# Patient Record
Sex: FEMALE | Race: WHITE | NOT HISPANIC OR LATINO | Employment: OTHER | URBAN - METROPOLITAN AREA
[De-identification: names, ages, dates, MRNs, and addresses within clinical notes are randomized per-mention and may not be internally consistent; named-entity substitution may affect disease eponyms.]

---

## 2017-12-08 ENCOUNTER — GENERIC CONVERSION - ENCOUNTER (OUTPATIENT)
Dept: OTHER | Facility: OTHER | Age: 77
End: 2017-12-08

## 2018-01-19 ENCOUNTER — ALLSCRIPTS OFFICE VISIT (OUTPATIENT)
Dept: OTHER | Facility: OTHER | Age: 78
End: 2018-01-19

## 2018-01-19 DIAGNOSIS — G31.84 MILD COGNITIVE IMPAIRMENT: ICD-10-CM

## 2018-01-24 VITALS
HEART RATE: 72 BPM | WEIGHT: 147.5 LBS | BODY MASS INDEX: 23.15 KG/M2 | RESPIRATION RATE: 14 BRPM | HEIGHT: 67 IN | OXYGEN SATURATION: 97 % | SYSTOLIC BLOOD PRESSURE: 158 MMHG | DIASTOLIC BLOOD PRESSURE: 86 MMHG

## 2018-02-04 DIAGNOSIS — G31.84 MCI (MILD COGNITIVE IMPAIRMENT): Primary | ICD-10-CM

## 2018-03-08 ENCOUNTER — HOSPITAL ENCOUNTER (OUTPATIENT)
Dept: MRI IMAGING | Facility: HOSPITAL | Age: 78
Discharge: HOME | End: 2018-03-08
Attending: PSYCHIATRY & NEUROLOGY
Payer: MEDICARE

## 2018-03-08 DIAGNOSIS — G31.84 MCI (MILD COGNITIVE IMPAIRMENT): ICD-10-CM

## 2018-03-08 PROCEDURE — 70551 MRI BRAIN STEM W/O DYE: CPT

## 2018-03-08 RX ORDER — MECLIZINE HYDROCHLORIDE 25 MG/1
1 TABLET ORAL EVERY 8 HOURS
COMMUNITY
End: 2019-04-11 | Stop reason: ALTCHOICE

## 2018-03-08 RX ORDER — LISINOPRIL 2.5 MG/1
5 TABLET ORAL DAILY
COMMUNITY

## 2018-03-08 RX ORDER — DONEPEZIL HYDROCHLORIDE 5 MG/1
1 TABLET, FILM COATED ORAL DAILY
COMMUNITY
End: 2018-03-12 | Stop reason: ALTCHOICE

## 2018-03-09 ENCOUNTER — TELEPHONE (OUTPATIENT)
Dept: NEUROLOGY | Facility: CLINIC | Age: 78
End: 2018-03-09

## 2018-03-09 NOTE — TELEPHONE ENCOUNTER
Called patient several times to attempt to schedule Neuropsychology appointment with no response in return from patient and no response to attempted to reach letters sent

## 2018-03-12 ENCOUNTER — OFFICE VISIT (OUTPATIENT)
Dept: NEUROLOGY | Facility: CLINIC | Age: 78
End: 2018-03-12
Payer: MEDICARE

## 2018-03-12 VITALS
HEART RATE: 73 BPM | BODY MASS INDEX: 23.79 KG/M2 | RESPIRATION RATE: 14 BRPM | HEIGHT: 67 IN | SYSTOLIC BLOOD PRESSURE: 165 MMHG | WEIGHT: 151.6 LBS | DIASTOLIC BLOOD PRESSURE: 83 MMHG

## 2018-03-12 DIAGNOSIS — G31.84 AMNESTIC MCI (MILD COGNITIVE IMPAIRMENT WITH MEMORY LOSS): Primary | ICD-10-CM

## 2018-03-12 PROCEDURE — 99215 OFFICE O/P EST HI 40 MIN: CPT | Performed by: PSYCHIATRY & NEUROLOGY

## 2018-03-12 RX ORDER — METHYLPREDNISOLONE 4 MG/1
TABLET ORAL
Refills: 0 | COMMUNITY
Start: 2018-02-11 | End: 2019-04-11 | Stop reason: ALTCHOICE

## 2018-03-12 RX ORDER — TRIAMCINOLONE ACETONIDE 1 MG/G
CREAM TOPICAL
Refills: 0 | COMMUNITY
Start: 2018-02-11 | End: 2019-08-14 | Stop reason: CLARIF

## 2018-03-12 RX ORDER — FOLIC ACID 1 MG/1
1 TABLET ORAL
COMMUNITY
End: 2019-08-14 | Stop reason: CLARIF

## 2018-03-12 RX ORDER — RIVASTIGMINE 4.6 MG/24H
1 PATCH, EXTENDED RELEASE TRANSDERMAL DAILY
Qty: 30 PATCH | Refills: 3 | Status: SHIPPED | OUTPATIENT
Start: 2018-03-12 | End: 2018-06-21 | Stop reason: DRUGHIGH

## 2018-03-12 RX ORDER — ALENDRONATE SODIUM 70 MG/1
70 TABLET ORAL WEEKLY
COMMUNITY
Start: 2017-07-05 | End: 2019-12-18

## 2018-03-12 RX ORDER — MULTIVITAMIN,THER AND MINERALS
1 TABLET ORAL
COMMUNITY

## 2018-03-12 NOTE — PROGRESS NOTES
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    Relevant Medications    rivastigmine (EXELON) 4 6 mg/24 hr TD 24 hr patch    Other Relevant Orders    Ambulatory referral to [facility]         Mrs Guru John has presented for follow up on cognitive dysfunction - she has amnestic type of MCI  Concern comes with her recent MRI brain Neuroquant as she has hippocampal volume loss at 11%, which is low normal, but significant enough to consider Alzheimer's dementia in case of progression of cognitive dysfunction  Patient was asked to start Exelon ER 4 6 mg patch, and she is to start cognitive therapy this week  Patient will schedule evaluation with Dr Lilia Murcia - better understanding of underlying neurodegenerative process  HPI/History of Present Illness    Mrs Guru John has a history of bilateral carotid bruit, bilateral hearing loss, hypertension, bilateral cataract, mitral and aortic heart valve disease, hyperlipidemia, osteoporosis, vertigo, vitamin-D deficiency,who presented for follow up on cognitive dysfunction and hearingloss  Patient has no new focal neurologic deficit, with no significant cognitive decline noted  Patient has several events when she was not able to recall where she is while in [place], don't remember parts of furniture in her house  Patient has difficulties recalling events and conversation  Patient has been frustrated as she has difficulties recalling names, and events  Patient just returned from [place] and she will start cognitive therapy tomorrow  No tremors, no falls, no focal weakness has been noted  The following portions of the patient's history were reviewed and updated as appropriate:   She  has no past medical history on file    She   Patient Active Problem List    Diagnosis Date Noted    Amnestic MCI (mild cognitive impairment with memory loss) 03/12/2018     She  has no past surgical history on file  Her family history is not on file  She  reports that she has never smoked  She has never used smokeless tobacco  Her alcohol and drug histories are not on file  Current Outpatient Prescriptions   Medication Sig Dispense Refill    aspirin 81 MG tablet Take 81 mg by mouth      lisinopril (ZESTRIL) 2 5 mg tablet Take 1 tablet by mouth daily      Multiple Vitamins-Iron (DAILY LEROY MULTIVITAMIN/IRON) TABS Take 1 tablet by mouth      alendronate (FOSAMAX) 70 mg tablet Take 70 mg by mouth Once a week      DOCOSAHEXAENOIC ACID PO Take 1,000 mg by mouth      folic acid (FOLVITE) 1 mg tablet Take 1 tablet by mouth      meclizine (ANTIVERT) 25 mg tablet Take 1 tablet by mouth every 8 (eight) hours      Methylprednisolone 4 MG TBPK TK UTD  0    rivastigmine (EXELON) 4 6 mg/24 hr TD 24 hr patch Place 1 patch on the skin daily 30 patch 3    triamcinolone (KENALOG) 0 1 % cream DARIUS AA TID PRN  0     No current facility-administered medications for this visit  No current outpatient prescriptions on file prior to visit  No current facility-administered medications on file prior to visit  She is allergic to ciprofloxacin            Objective:    Blood pressure 165/83, pulse 73, resp  rate 14, height 5' 7" (1 702 m), weight 68 8 kg (151 lb 9 6 oz)  Physical Exam/Neurological Exam    CONSTITUTIONAL: NAD, pleasant  NECK: supple, no lymphadenopathy, no thyromegaly, no JVD  CARDIOVASCULAR: RRR, normal S1S2, no murmurs, no rubs  RESP: clear to auscultation bilaterally, no wheezes/rhonchi/rales  ABDOMEN: soft, non tender, non distended  SKIN: no rash or skin lesions  EXTREMITIES: no edema, pulses 2+bilaterally  PSYCH: appropriate mood and affect  NEUROLOGIC COMPREHENSIVE EXAM: Patient is oriented to person, place and time, NAD; appropriate affect   CN II, III, IV, V, VI, VII,VIII,IX,X,XI-XII intact with EOMI, PERRLA, OKN intact, VF grossly intact, fundi poorly visualized secondary to pupillary constriction; symmetric face noted  Motor: 5/5 UE/LE bilateral symmetric; Sensory: intact to light touch and pinprick bilaterally; normal vibration sensation feet bilaterally; Coordination within normal limits on FTN and SHAHAB testing; DTR: 2/4 through, no Babinski, no clonus  Tandem gait is intact  Romberg: negative  ROS:  12 points of review of system was reviewed with the patient and was unremarkable with exception: see HPI  Review of Systems   Constitutional: Negative  Negative for appetite change and fever  HENT: Negative  Negative for hearing loss, tinnitus, trouble swallowing and voice change  Eyes: Negative  Negative for photophobia and pain  Respiratory: Negative  Negative for shortness of breath  Cardiovascular: Negative  Negative for palpitations  Gastrointestinal: Negative  Negative for nausea and vomiting  Endocrine: Negative  Negative for cold intolerance and heat intolerance  Genitourinary: Negative  Negative for dysuria, frequency and urgency  Musculoskeletal: Negative  Negative for myalgias and neck pain  Skin: Negative  Negative for rash  Neurological: Negative for tremors, seizures, syncope, facial asymmetry, speech difficulty, weakness, light-headedness, numbness and headaches  Hematological: Negative  Does not bruise/bleed easily  Psychiatric/Behavioral: Positive for confusion  Negative for hallucinations and sleep disturbance

## 2018-03-13 ENCOUNTER — TELEPHONE (OUTPATIENT)
Dept: NEUROLOGY | Facility: CLINIC | Age: 78
End: 2018-03-13

## 2018-03-13 ENCOUNTER — EVALUATION (OUTPATIENT)
Dept: SPEECH THERAPY | Facility: CLINIC | Age: 78
End: 2018-03-13
Payer: MEDICARE

## 2018-03-13 DIAGNOSIS — R48.8 OTHER SYMBOLIC DYSFUNCTIONS (CODE): Primary | ICD-10-CM

## 2018-03-13 DIAGNOSIS — G31.84 AMNESTIC MCI (MILD COGNITIVE IMPAIRMENT WITH MEMORY LOSS): ICD-10-CM

## 2018-03-13 PROCEDURE — 96125 COGNITIVE TEST BY HC PRO: CPT

## 2018-03-13 PROCEDURE — G9168 MEMORY CURRENT STATUS: HCPCS

## 2018-03-13 PROCEDURE — G9169 MEMORY GOAL STATUS: HCPCS

## 2018-03-13 NOTE — TELEPHONE ENCOUNTER
Leland Barton - please help scheduling  appointment with Dr Tommy Hightower or Dr Nohemi Bueno - patient apologized, as she was in [place] and was not able to contact your office  Thank you in advance

## 2018-03-13 NOTE — PROGRESS NOTES
Speech-Language Pathology Initial Evaluation    Today's date: 3/13/2018  Patients name: Manuelito Rai  : 1940  MRN: [id number]  Referring provider: Castro Paniagua     Medical history significant for: No past medical history on file  Medication list:   Current Outpatient Prescriptions   Medication Sig Dispense Refill    alendronate (FOSAMAX) 70 mg tablet Take 70 mg by mouth Once a week      aspirin 81 MG tablet Take 81 mg by mouth      DOCOSAHEXAENOIC ACID PO Take 1,000 mg by mouth      folic acid (FOLVITE) 1 mg tablet Take 1 tablet by mouth      lisinopril (ZESTRIL) 2 5 mg tablet Take 1 tablet by mouth daily      meclizine (ANTIVERT) 25 mg tablet Take 1 tablet by mouth every 8 (eight) hours      Methylprednisolone 4 MG TBPK TK UTD  0    Multiple Vitamins-Iron (DAILY LEROY MULTIVITAMIN/IRON) TABS Take 1 tablet by mouth      rivastigmine (EXELON) 4 6 mg/24 hr TD 24 hr patch Place 1 patch on the skin daily 30 patch 3    triamcinolone (KENALOG) 0 1 % cream DARIUS AA TID PRN  0     No current facility-administered medications for this visit  Allergies: Allergies   Allergen Reactions    Ciprofloxacin Hives     Action Taken: unknown;          Subjective comments: Patients  manuelito reports "She doesn't remember things from day to day and even more often hour to hour "    Reason for referral: Change in cognitive status  Prior functional status:Patient has been reporting progressing decline of memory function  Clinically complex situations: Mental/behavioral diagnosis affecting rate of recovery    History: Patient is a 67 y/o F who was referred for ST by her neurologist Dr Theodore Nugent after persistent cognitive dysfunction- particularly her memory  Per MD note from yesterday 3/12/18,  Mrs Alfredito Rivers has presented for follow up on cognitive dysfunction - she has amnestic type of MCI   Concern comes with her recent MRI brain Neuroquant as she has hippocampal volume loss at 11%, which is low normal, but significant enough to consider Alzheimer's dementia in case of progression of cognitive dysfunction  Patient was asked to start Exelon ER 4 6 mg patch, and she is to start cognitive therapy this week  Mrs Tasha Draper has a history of bilateral carotid bruit, bilateral hearing loss, hypertension, bilateral cataract, mitral and aortic heart valve disease, hyperlipidemia, osteoporosis, vertigo, vitamin-D deficiency,who presented for follow up on cognitive dysfunction and hearingloss  Patient has no new focal neurologic deficit, with no significant cognitive decline noted  Patient has several events when she was not able to recall where she is while in [place], don't remember parts of furniture in her house  Patient has difficulties recalling events and conversation  Patient has been frustrated as she has difficulties recalling names, and events  It was also noted in the MD note that the patient will be scheduling an appointment with Dr Malini Daugherty a neuropsychologist for additional assessment  Patient and her  reports a lack of insight and that patient is no longer responsible for bills but is still independent in medication management      Hearing: exam approx 2 months ago through [place] and was recommended to get hearing aids but patient decided against them at this time  Vision: wears glasses for distance    Primary language: English   Preferred language: English     Home environment/lifestyle: Lives home with   Highest level of education: High School  Current/prior services being received: Physical Therapy in [place] for vertigo within the last 6 months- patient reports that this is resolved    Mental status: Alert  Behavior status: Cooperative  Communication modalities: Verbal  Recent speech/language therapy: none  Rehabilitation prognosis: [address] rehab potential to reach the established goals    Assessments    *Patient indicated that she is experiencing the following symptoms:    · Memory: Remembering your schedule, Remembering previous learing and Driving directions    · Attention: Focusing or concentrating on a specific task, Sustaining attention on a task and Dividing your attention (i e , multi-tasking)    · Executive Functions: Organizing/ planning written work, emails, and/or daily tasks and Initiating tasks    · Visual: New onset motion sickness in car    · Emotional: Personality changes and Keeping cognitive and physical pace    · Increased Sensitivities to: Noise      The Repeatable Battery for the Assessment of Neuropsychological Status (RBANS) is a brief, individually-administered assessment which measures attention, language, visuospatial/constructional abilities, and immediate & delayed memory  The RBANS is intended for use with adolescents to adults, ages 15 to 80 years  The following results were obtained during the administration of the assessment  Form: A    Cognitive Domain/Subtest: Index Score: Percentile Rank: Classification:   IMMEDIATE MEMORY 53  1%ile Extremely Low        1  List Learning (16/40)        2  Story Memory (2/24)       VISUOSPATIAL/  CONSTRUCTIONAL 112 79%ile High Average        3  Figure Copy (20/20)        4  Line Orientation (17/20)       LANGUAGE 10 12%ile Low Average        5  Picture Naming (10/10)        6  Semantic Fluency (9/40)       ATTENTION 82 12%ile Low Average        7  Digit Span (7/16)        8  Coding (39/89)       DELAYED MEMORY 52  1%ile Extremely Low        9  List Recall (0/10)        10  List Recognition (16/20)        11  Story Recall (0/12)        12  Figure Recall (4/20)         Sum of Index Scores:  381   Total Score:  70   Percentile: 2%ile   Classification: Extremely Low     The Brief Cognitive Assessment Test (BCAT) is a multi-domain cognitive tool that assesses a patients orientation, verbal recall, visual recognition, visual recall, attention, abstraction, language, executive functions, and visuospatial processing   BCAT is sensitive to the full spectrum of cognitive functioning (normal, MCI, mild dementia, moderate-severe dementia) and can predict basic and instrumental activities of daily living (ADL, IADL)  During todays administration of the test, pt achieved a total score of  40/50 points  Using the BCAT Crosswalk Table as a reference, pts score falls within the range and may be suggestive of "Mild Cognitive Impairment"  The Crosswalk Table suggests the following:    Cognitive Stage: Mild Cognitive Impairment (BCAT Range: 34-46 // MMSE: 24-27 // GDS: 3)  Cognitive & Functional Issues: Generally functionally normal, but early specific functional declines (IADL); subjective and objective memory deficits  Individuals at lower end of range more likely to have more significant cognitive deficits  Lower scores more suggestive of residential support needs  At the bottom range of MCI, consider medication management and consider support around community reintegration  *Pt named 9 concrete category members (animals) in 60 sec (norm=15+)  -- BELOW AVERAGE    *Pt named 7 abstract category members (words beginning with letter 'm') in 60 sec (norm=10+)  -- BELOW AVERAGE       Goals  Short-term goals:  1  Patient will complete concrete and abstract categorization tasks to 80% accuracy to facilitate improved generative naming skills and working memory, to be achieved in 4-6 weeks  2 Patient will complete auditory immediate and short term memory tasks to 80% accuracy to facilitate increased ability to retell narratives and recall information within functional living environment, to be achieved in 4-6 weeks  3  Patient will complete thought organization tasks (e g , sequencing, deduction puzzles, etc ) with 80% accuracy to facilitate increased executive functioning skills, to be achieved in 4-6 weeks    4 Patient will complete complex auditory attention processing tasks (e g , sentence unscramble, ranking numbers/words, etc ) with 80% accuracy, to be achieved in 4-6 weeks  5   Patient will be educated on the use of external memory aids and compensatory strategies with 80% accuracy to facilitate increased recall of routine, personal information, and recent events, to be achieved in 4-6 weeks  Long-term goals:  1  Patient will improve functional cognitive-linguistic skills with the implementation of cues and external aids, by discharge  2  Patient will demonstrate cognitive-communication skills consistent with age and education given use of compensatory strategies when needed to resume baseline activities and responsibilities in home, community, and work/school settings by discharge  Functional Limitations Reporting (G-codes):   Flowsheet Rows    Flowsheet Row Most Recent Value   SLP G-Codes   FOTO information reviewed  N/A   Assessment Type  Evaluation   Functional Limitations  Memory   Memory Current Status ()  CK   Memory Goal Status ()  CI          Impressions/Recommendations    Impressions: Patient presents with mild-moderate cognitive-linguistic deficits characterized by decreased attention, thought organization and immediate and delayed recall  Recommendations:  -Patient would benefit from outpatient skilled Speech Therapy services : Cognitive-Linguistic therapy    -Frequency: 2x weekly  -Duration: 4-6 weeks    -Intervention certification from: 2/96/1399  -Intervention certification to: 2/52/30  -Intervention comments: Test Administration conducted from 1:00p- 2:05p  Scoring and Interpretation conducted from 2:05-3:05p    Visit Tracking:  -Referring provider: Epic  -Billing guidelines: CMS  -Visit #1/10   -Medicare   [facility] due 4/13/18

## 2018-03-19 ENCOUNTER — OFFICE VISIT (OUTPATIENT)
Dept: SPEECH THERAPY | Facility: CLINIC | Age: 78
End: 2018-03-19
Payer: MEDICARE

## 2018-03-19 DIAGNOSIS — R48.8 OTHER SYMBOLIC DYSFUNCTIONS (CODE): Primary | ICD-10-CM

## 2018-03-19 DIAGNOSIS — G31.84 AMNESTIC MCI (MILD COGNITIVE IMPAIRMENT WITH MEMORY LOSS): ICD-10-CM

## 2018-03-19 PROCEDURE — 92507 TX SP LANG VOICE COMM INDIV: CPT

## 2018-03-19 NOTE — PROGRESS NOTES
Daily Speech Treatment Note    Today's date: 3/19/2018  Patients name: Wanda Ellis  : 1940  MRN: [id number]  Safety measures: decreased cognition  Referring provider: Max Pascual, *    Primary Diagnosis/Billing code: R48 8  Secondary Diagnosis/ Billing code: G31 84    Visit Tracking:  -Referring provider: Epic  -Billing guidelines: CMS  -Visit #1/10   -Medicare  [place]  -RE due 18    Subjective/Behavioral:  -"I don't think it was that bad"    Objective/Assessment:  -Patient's family member/caregiver was present during today's session   -Reviewed testing results and goals in plan care with patient  Patient is in agreement at this time  Short-term goals:  1  Patient will complete concrete and abstract categorization tasks to 80% accuracy to facilitate improved generative naming skills and working memory, to be achieved in 4-6 weeks  Fill in blanks with recall: Patient was asked to fill in missing letters within a 10 word list of words within a category (i e , fruits; o_an_e (orange))  Patient completed ID of words in  opp  Patient was then educated on "chunking" memory strategy, and asked to use this strategy to study or memorize the two lists of words  Patient was provided with MOD-MAX cues to create groups/chunking of words  Immediate recall completed in 10/10 opp for list 1, and 7/10 opp for list 2  DELAYED RECALL: At the end of the session; she was asked to recall the words from the first activity  Despite using association strategies, pt recalled   2 Patient will complete auditory immediate and short term memory tasks to 80% accuracy to facilitate increased ability to retell narratives and recall information within functional living environment, to be achieved in 4-6 weeks  To target immediate memory; patient participated in a mental manipulation task   Words were read aloud by clinician, and patient was asked to recall words in a specific order denoted by SLP  Patient completed recall of words in reverse order (field of 3) in 9/10 opp; increased to 10/10 with repetition of stimuli  Patient completed recall of words in word order (field of 3) in 9/10 opp; increased to 10/10 with repetition of stimuli  Patient completed recall of words in alphabetical order (field of 3) in 10/10 opp  3  Patient will complete thought organization tasks (e g , sequencing, deduction puzzles, etc ) with 80% accuracy to facilitate increased executive functioning skills, to be achieved in 4-6 weeks  4 Patient will complete complex auditory attention processing tasks (e g , sentence unscramble, ranking numbers/words, etc ) with 80% accuracy, to be achieved in 4-6 weeks  5   Patient will be educated on the use of external memory aids and compensatory strategies with 80% accuracy to facilitate increased recall of routine, personal information, and recent events, to be achieved in 4-6 weeks  Discussed external aids in place at home at this time  Patient's  reports she uses a pill box  At times, she needs reminders to take them  She also writes things on a calendar  "memory tips" packet was provided today  Plan:  -Patient was provided with home exercises/activities to target goals in plan of care at the end of today's session   -Continue with current plan of care

## 2018-03-22 ENCOUNTER — APPOINTMENT (OUTPATIENT)
Dept: SPEECH THERAPY | Facility: CLINIC | Age: 78
End: 2018-03-22
Payer: MEDICARE

## 2018-03-26 ENCOUNTER — APPOINTMENT (OUTPATIENT)
Dept: SPEECH THERAPY | Facility: CLINIC | Age: 78
End: 2018-03-26
Payer: MEDICARE

## 2018-03-27 ENCOUNTER — APPOINTMENT (OUTPATIENT)
Dept: SPEECH THERAPY | Facility: CLINIC | Age: 78
End: 2018-03-27
Payer: MEDICARE

## 2018-03-28 ENCOUNTER — OFFICE VISIT (OUTPATIENT)
Dept: SPEECH THERAPY | Facility: CLINIC | Age: 78
End: 2018-03-28
Payer: MEDICARE

## 2018-03-28 DIAGNOSIS — R48.8 OTHER SYMBOLIC DYSFUNCTIONS (CODE): Primary | ICD-10-CM

## 2018-03-28 DIAGNOSIS — G31.84 AMNESTIC MCI (MILD COGNITIVE IMPAIRMENT WITH MEMORY LOSS): ICD-10-CM

## 2018-03-28 PROCEDURE — 92507 TX SP LANG VOICE COMM INDIV: CPT

## 2018-03-28 NOTE — PROGRESS NOTES
Daily Speech Treatment Note    Today's date: 3/28/2018  Patients name: Wanda Ellis  : 1940  MRN: [id number]  Safety measures: decreased cognition  Referring provider: Max Pascual, *    Primary Diagnosis/Billing code: R48 8  Secondary Diagnosis/ Billing code: G31 84    Visit Tracking:  -Referring provider: Epic  -Billing guidelines: CMS  -Visit #3/10   -Medicare  [employer]  -RE due 18    Subjective/Behavioral:  -"i'm ok"    Objective/Assessment:  -Patient's family member/caregiver was present during today's session   -Reviewed patient's home exercises/activities completed since last appointment  Patient forgot to bring in today  Patient arrived with iPad from home  Pt's granddaughter set up an alarm daily as a reminder to take meds  Patient appeared frustrated, and reports she "doesnt have a problem taking meds " Her  and I discussed with her the reasoning behind cognitive therapy, despite her not realizing she has memory problems  Patient does not appear motivated, but participated without difficulty today  Short-term goals:  1  Patient will complete concrete and abstract categorization tasks to 80% accuracy to facilitate improved generative naming skills and working memory, to be achieved in 4-6 weeks  2 Patient will complete auditory immediate and short term memory tasks to 80% accuracy to facilitate increased ability to retell narratives and recall information within functional living environment, to be achieved in 4-6 weeks  Picture Retention Activity: To target working memory, the patient was shown a detailed picture (i e , kitchen scene) and was given 60 seconds to review it  Then they were asked [facility]- questions about picture  (i e , what two items were on the counter)  Task completed over 3 trials with 27/30 questions answered accurately  Delayed recall:     3   Patient will complete thought organization tasks (e g , sequencing, deduction puzzles, etc ) with 80% accuracy to facilitate increased executive functioning skills, to be achieved in 4-6 weeks  Anagrams: To target mental manipulation during a word finding activity, patient was asked to rearrange the letters within the word to create a new word (i e , late = tale)  Task completed over 15 trials  Completed 12/15 independently  Deduction by exclusion: Completed independently with 100% acc  4 Patient will complete complex auditory attention processing tasks (e g , sentence unscramble, ranking numbers/words, etc ) with 80% accuracy, to be achieved in 4-6 weeks  5   Patient will be educated on the use of external memory aids and compensatory strategies with 80% accuracy to facilitate increased recall of routine, personal information, and recent events, to be achieved in 4-6 weeks  Plan:  -Patient was provided with home exercises/activities to target goals in plan of care at the end of today's session   -Continue with current plan of care

## 2018-03-30 ENCOUNTER — APPOINTMENT (OUTPATIENT)
Dept: SPEECH THERAPY | Facility: CLINIC | Age: 78
End: 2018-03-30
Payer: MEDICARE

## 2018-03-30 ENCOUNTER — TELEPHONE (OUTPATIENT)
Dept: NEUROLOGY | Facility: CLINIC | Age: 78
End: 2018-03-30

## 2018-03-30 DIAGNOSIS — G31.84 MCI (MILD COGNITIVE IMPAIRMENT): Primary | ICD-10-CM

## 2018-03-30 NOTE — TELEPHONE ENCOUNTER
MSW spoke with Marcel Lantigua,   Rod Rhina will print script, put it in an envelope, and deliver it to the [facility] on [address]  Patient/ will then  same on Monday 4/2/18 when the release is signed to transfer care to [address] [place] [address]  MSW will be available to patient/ as needed

## 2018-03-30 NOTE — TELEPHONE ENCOUNTER
LATE ENTRY from 3/30/18:    MSW received a call from patient's , Kyliejesus Parnelluels, who stated that they would like to change speech therapy locations  Ellyn Singh stated that they are currently travelling 60 miles round trip to speech therapy a few times a month, and that it is taxing on the patient  Ellyn Singh stated that he has located a closer location for speech therapy at a satellite office for the [address] [place] [address] Road was inquired about what would be the procedure to change providers  MSW spoke with Robert Trinh () at [facility] on [address] where patient is currently going  Robert Trinh advised that patient will need to come in to sign a Release of Information paper so that patient's records can be forwarded to the new speech therapy provider  Robert Trinh advised that patient/ should provide her with the fax number for the new speech therapy location and she will fax patient's records to them directly  Robert Trinh stated that patient will need to be discharged from [place] [address], so a new script will be needed for patient to obtain services elsewhere  MSW will request this script from Dr Gilmar Santos  MSW will deliver the script to the [place] therapy office so patient can  same when she comes in to sign release  MSW phoned Ellyn Singh to make him aware of above  Ellyn Singh stated that patient has an appointment on Mpnday 4/2/18 at [address] so they will sign release/ script for alternate speech therapy at that time  MSW will be available to patient/family as needed

## 2018-04-02 ENCOUNTER — OFFICE VISIT (OUTPATIENT)
Dept: SPEECH THERAPY | Facility: CLINIC | Age: 78
End: 2018-04-02
Payer: MEDICARE

## 2018-04-02 DIAGNOSIS — G31.84 AMNESTIC MCI (MILD COGNITIVE IMPAIRMENT WITH MEMORY LOSS): ICD-10-CM

## 2018-04-02 DIAGNOSIS — R48.8 OTHER SYMBOLIC DYSFUNCTIONS (CODE): Primary | ICD-10-CM

## 2018-04-02 PROCEDURE — G9170 MEMORY D/C STATUS: HCPCS

## 2018-04-02 PROCEDURE — G9169 MEMORY GOAL STATUS: HCPCS

## 2018-04-02 PROCEDURE — 92507 TX SP LANG VOICE COMM INDIV: CPT

## 2018-04-02 NOTE — PROGRESS NOTES
Daily Speech Treatment Note    Today's date: 2018  Patients name: Luis Payne  : 1940  MRN: [id number]  Safety measures: decreased cognition  Referring provider: Mary Frye, Jae    Primary Diagnosis/Billing code: R48 [id number]  Secondary Diagnosis/ Billing code: G31 [id number]    Visit Tracking:  -Referring provider: Epic  -Billing guidelines: CMS  -Visit #4/10   -Medicare  [employer]  -RE due 18    Subjective/Behavioral:  -"today is going to be our last day, we are transferring to a speech therapist closer to home"    Objective/Assessment:  -    Short-term goals:  1  Patient will complete concrete and abstract categorization tasks to 80% accuracy to facilitate improved generative naming skills and working memory, to be achieved in 4-6 weeks  To target word generation using divergent classification, patient was asked to name 15 items in a concrete category (i e , fruits)  Task completed over 3 trials, with average of 10 words/minute  (GOAL is 15/min)    2  Patient will complete auditory immediate and short term memory tasks to 80% accuracy to facilitate increased ability to retell narratives and recall information within functional living environment, to be achieved in 4-6 weeks  To target immediate memory; patient participated in a mental manipulation task  Words were read aloud by clinician, and patient was asked to recall words in a specific order denoted by SLP  Patient completed recall of words in word order (field of 4) in  opp; increased to  with repetition of stimuli  3  Patient will complete thought organization tasks (e g , sequencing, deduction puzzles, etc ) with 80% accuracy to facilitate increased executive functioning skills, to be achieved in 4-6 weeks  4 Patient will complete complex auditory attention processing tasks (e g , sentence unscramble, ranking numbers/words, etc ) with 80% accuracy, to be achieved in 4-6 weeks      5   Patient will be educated on the use of external memory aids and compensatory strategies with 80% accuracy to facilitate increased recall of routine, personal information, and recent events, to be achieved in 4-6 weeks  Plan:  -Discharge

## 2018-04-05 ENCOUNTER — APPOINTMENT (OUTPATIENT)
Dept: SPEECH THERAPY | Facility: CLINIC | Age: 78
End: 2018-04-05
Payer: MEDICARE

## 2018-04-09 ENCOUNTER — APPOINTMENT (OUTPATIENT)
Dept: SPEECH THERAPY | Facility: CLINIC | Age: 78
End: 2018-04-09
Payer: MEDICARE

## 2018-04-12 ENCOUNTER — APPOINTMENT (OUTPATIENT)
Dept: SPEECH THERAPY | Facility: CLINIC | Age: 78
End: 2018-04-12
Payer: MEDICARE

## 2018-04-16 ENCOUNTER — APPOINTMENT (OUTPATIENT)
Dept: SPEECH THERAPY | Facility: CLINIC | Age: 78
End: 2018-04-16
Payer: MEDICARE

## 2018-04-19 ENCOUNTER — APPOINTMENT (OUTPATIENT)
Dept: SPEECH THERAPY | Facility: CLINIC | Age: 78
End: 2018-04-19
Payer: MEDICARE

## 2018-04-23 ENCOUNTER — APPOINTMENT (OUTPATIENT)
Dept: SPEECH THERAPY | Facility: CLINIC | Age: 78
End: 2018-04-23
Payer: MEDICARE

## 2018-04-26 ENCOUNTER — APPOINTMENT (OUTPATIENT)
Dept: SPEECH THERAPY | Facility: CLINIC | Age: 78
End: 2018-04-26
Payer: MEDICARE

## 2018-05-03 ENCOUNTER — OFFICE VISIT (OUTPATIENT)
Dept: NEUROLOGY | Facility: CLINIC | Age: 78
End: 2018-05-03

## 2018-05-03 DIAGNOSIS — G31.84 AMNESTIC MCI (MILD COGNITIVE IMPAIRMENT WITH MEMORY LOSS): Primary | ICD-10-CM

## 2018-05-03 RX ORDER — PRAVASTATIN SODIUM 40 MG
80 TABLET ORAL DAILY
COMMUNITY

## 2018-05-03 NOTE — PROGRESS NOTES
REFERRAL QUESTION and NEUROPSYCHOLOGICAL NECESSITY: Charity Zazueta is a pleasant, right-handed, 68 y o , , woman, whose medical history is remarkable for amnestic Mild Cognitive Impairment (MCI)  The patient  was referred for a neuropsychological consultation by her neurologist, Dr Lino Rivera,  for characterization of her neurocognitive and emotional functioning to assist with differential diagnosis and treatment recommendations  TESTS ADMINISTERED: [employer] - Test of Premorbid Functioning (TOPF); Anam & Anam (BNT); [place] Verbal Learning Test- Second Edition (CVLT-II); Category Fluency (animals); Clock Drawing Test (CDT); Controlled Oral Word Association Test (COWAT); Dementia Rating Scale-2 (DRS-2); Geriatric Depression Scale (GDS); Grooved Pegboard;  Judgment of Line Orientation (CHRIS); Maicol Complex Figure Test (RCFT; Copy); Trail Making Test (TMT); Wechsler Abbreviated Scale of Intelligence- Second Edition (WASI-II); Wechsler Adult Intelligence Scale-Fourth Edition (WAIS-IV; Select Subtests); Wechsler Memory Scale-Fourth Edition (WMS-IV; Select Subtests); [employer] Edition (WCST-64)  Written consent was obtained prior to the evaluation (scanned and stored in the patients electronic and paper charts)  [Total licensed billing psychologists professional time including clinical interview; test selection, administration and scoring; interpretation of tests administered; integration of test results and other clinical data, and preparing final report = (79174: 6 hours)]  PRESENTING CONCERNS and RELEVANT HISTORY: The following information was obtained through a clinical interview with the patient and her , Leonidas Emmanuel, as well as through review of available medical records  Ms  Arjun Leon acknowledged some forgetfulness, but she does not believe it is as significant or severe as others seem to do   She does not acknowledge any significant functional changes as a result of a decline in her cognitive abilities  In fact, Ms Dung Jose stated that she remains independent across activities of daily living (ADLs), including basic ADLs, medication management, and management of the finances; however, she then noted that her  took over the finances, relatively recently, presumably due to difficulty on her end  With that being said, Ms Dung Jose believes she would be able to manage the finances if necessary  She denied issues with driving, such as getting lost, accidents, or violations  She spends her day taking care of the house and watching television  Changes in olfaction, speech, or gait were denied  Ms Dung Jose reported possible hypersomnolence, as she sleeps during the day, but stated that she feels rested upon wakening and she denied dream re-enactment, as well as auditory/visual hallucinations  Ms Dung Jose is prescribed rivastigmine  The patient has been participating in cognitive remediation through speech therapy with some benefit  On a recent MMSE, conducted in January, 2018, Ms Moody's score was 27/30  In March, 2018, Ms Dung Jose underwent an MRI, with NeuroQuant Analysis, which demonstrated, "1   Mild, chronic microangiopathy  2   NeuroQuant analysis was performed: Normal study; Does not support neurodegeneration  Consider follow-up neural quadrant assessment in one year's time to evaluate for trending  3   No acute infarction, intracranial hemorrhage or mass effect " Previous lab work has been largely unremarkable  At the end of today's appointment, it was noted that Ms Dung Jose sustained a possible concussion in July, 2017, following a mechanical fall  The patient immediately experienced a headache and dizziness, which improved with time; however, there occasional dizziness persists, especially with positional changes (e g , looking side to side)  Fabricio Blackwood, however, reported a slightly different clinical picture   He explained that there has been increased forgetfulness, which has been going on for 6 months or so, though he was unsure if it has been progressive  He reported that Ms Jose Francisco Dejesus loses information fairly quickly and does not necessarily benefit from cuing  Three months ago, he recalled a trip to [place] and noted that Ms Jose Francisco Dejesus was quite upset because she did not remember scheduling or agreeing to go on the trip  Additionally, she did not recognize the house that had been in their family for 3 years (she had been there 3 or 4 previous times)  When her  brought this to her attention afterward, she was amnestic for the event/interaction  Approximately 1 month ago, there was another incident when Ms Jose Francisco Dejesus recalled/relived an interaction with her daughter that had, in actuality, occurred almost 1 year prior; however, the patient believed that it occurred at that time  Teresa Padilla noted that he took over managing the couple's finances 6 to 8 months ago at Ms  Heidy's request due to it being too confusing  He believes that this a matter managing and organizing the paperwork, and less so due to carrying out the mental calculations  He confirmed that there was no issue with remembering to pay the bills or completing checks  Regarding medication management, Teresa Padilla noted that his wife was forgetting dosages, so about 4 weeks ago he began organizing her medications using a pillbox  Teresa Padilla stated their children have noted and commented on these changes as well  He denied bro evidence of hallucinations or delusional thought processes  He noted that 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    Past Medical History:   Diagnosis Date    Hyperlipidemia     Hypertension     Memory loss      PSYCHIATRIC/PSYCHOLOGICAL STATUS and HISTORY: Ms Jose Francisco Dejesus noted that she has been depressed and that there are times when she feels down, which she acknowledged may be due to limited activity/engagement   She denied previous treatments, including medication or therapy  Symptoms of anxiety were also denied  Alcohol consumption is unremarkable and use of illicit substances or nicotine products was denied  Appetite is good and unchanged  She did report hypersomnolence  Physical pain was denied  Ms Arnoldo Price family history is notable for depression in her mother, who also made a suicide attempt at one point in time  RISK ASSESSMENT: Suicidal/homicidal ideation, intent, or plan was denied  The patient was deemed to be at low risk for self-harm or harm to others  PSYCHOSOCIAL HISTORY: Ms Man Palafox was born and raised in [place], [place]  She was 1 of 6 children who were raised by both parents until her father passed away when she was 15years-old  She completed 12 years of formal education, describing herself as an "average" student  A history of learning disability was denied  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 [employer]  She and her  have been  for 59 years and have 6 children  No past surgical history on file  Current Outpatient Prescriptions   Medication Sig Dispense Refill    alendronate (FOSAMAX) 70 mg tablet Take 70 mg by mouth Once a week      aspirin 81 MG tablet Take 81 mg by mouth      DOCOSAHEXAENOIC ACID PO Take 1,000 mg by mouth      folic acid (FOLVITE) 1 mg tablet Take 1 tablet by mouth      lisinopril (ZESTRIL) 2 5 mg tablet Take 1 tablet by mouth daily      meclizine (ANTIVERT) 25 mg tablet Take 1 tablet by mouth every 8 (eight) hours      Methylprednisolone 4 MG TBPK TK UTD  0    Multiple Vitamins-Iron (DAILY LEROY MULTIVITAMIN/IRON) TABS Take 1 tablet by mouth      rivastigmine (EXELON) 4 6 mg/24 hr TD 24 hr patch Place 1 patch on the skin daily 30 patch 3    triamcinolone (KENALOG) 0 1 % cream DARIUS AA TID PRN  0     No current facility-administered medications for this visit        BEHAVIORAL OBSERVATIONS: Ms Man Palafox arrived promptly for her appointment and was accompanied by her   The patient presented as a pleasant, well-groomed, casually-dressed, female who appeared her stated chronological age  Mood was reported to be "down" and affect was generally flat, except for the moments when she became tearful upon hearing her  relay some of his observations  Speech was normal in rate but low in volume  Language comprehension appeared to be intact, as Ms Dung Jose was able to understand task instructions and complete all tasks as they were administered to her  Thought processes were goal-directed, logical, and linear  Hearing and vision were adequate for testing  On a brief mental status, Ms Dung Jose was oriented to time (except for date) and , but not to age, place, or situation  She was unable to recall any of the 3 most recent Presidents  Serial 7's was impaired (2/5)  The patient was cooperative with testing procedures and appeared motivated throughout the evaluation  During testing, Ms Dung Jose became quite tearful during a memory measure  Embedded indicators of performance validity were within normal limits, and therefore, the results reported below appeared to be reflective of her current level of functioning  TEST RESULTS: A performance-based indicator of premorbid intellectual functioning was in the average range (TOPF: 93; 32nd %ile) and consistent with demographically-based predictions  On an abbreviated, standardized, measure of intellectual functioning, the patients performance was also in the average range (WASI-II: 100; 50th %ile), characterized by average verbal and nonverbal abilities  On a scale used to measure and track mental status in older adults (DRS), Ms Sharpe total score was above the cutoff of 123 commonly used to identify cognitive impairment (138/144)  When taking her age and years of education into consideration, Ms Sharpe score was in the average range (60th-71st %ile)   Attention, conceptualization, initiation/perseveration, and construction,  were intact (41st -89th %ile), while her memory performance was mildly impaired (11th - 18th %ile)  Immediate auditory attention was in the borderline to average range, while concentration and working memory were in the average to high average range  Ms Mariia Lomas visuomotor information processing, while rapidly sequencing and/or copying symbols, was in the superior range (0 errors)  Cognitive flexibility, while rapidly sequencing an alphanumeric series, was in the high average range (1 error)  Nonverbal abstract reasoning was in the average range  Generative word fluency, when provided with letters by the examiner, was in the borderline range relative to a demographically-similar peer group  Novel problem-solving was reduced, as the patient was able to complete 2 of 6 categories  She had difficulty conceptualizing and learning the various sorting principles based on the feedback she received  Her performance during a clock drawing task was relatively intact with exception of minor spatial issues  Upper extremity motor speed and fine manual dexterity were in the average range, bilateraly (drops: 0 right hand; 2 left hand)  Verbal expression of word meanings was in the average range  Relative to a demographically-similar peer group, the patient's visual confrontation naming was intact (raw: 52/60; 5/8 phonemic cues) and her categorical fluency was in the low average range  Visuospatial orientation was intact (raw: 19/30)  Graphomotor visuoconstruction of a complex geometric figure was reduced due to minor misalignments and inaccuracies (raw: 30/36)  Immediate recall of auditory information presented in contextual/narrative format was in the borderline range, while her long-delay recall of this information was in the extremely low range, as Ms Jian Gaines was unable to recall information from either story  Her performance during a recognition trial was impaired (raw: 14/23)   On a serial list learning task, the patient's learning over trials was in the extremely low range, as she did not benefit from repeated exposure to information across trials (3, 4, 4, 4, & 4/16)  Immediate recall of items from a distractor list was in the low average range, relative to a same-aged peer group (3/16)  Immediate and long-delayed recall of items from the original wordlist was impaired (0/16), with no significant benefit from cuing  Her free recall performances were marked by an excessive number of intrusive responses  When presented in recognition format, her ability to accurately discern target items from distractors was impaired (hits: 13/16; 16 false positives)  With regard to visual memory, Ms Moody's immediate recall of simple geometric figures was in the low average range, while her long-delayed recall of this information was in the borderline range  She only retained 22% of the information initially encoded, demonstrative of decay over time  Her performance during a recognition trial was impaired (raw: 3/7)  Ms Lila Elizondo also completed an age-appropriate, self-report questionnaire assessing symptoms of depression, on which she endorsed a minimal degree (GDS: 9/30)  SUMMARY and DISCUSSION: Ms Navarro Range intellectual functioning was estimated to fall in the average range, which was generally consistent with demographically-based predictions  While attention was variable, concentration and working memory were well-preserved, as was information processing speed  There was evidence of mild executive dysfunction, characterized by impaired problem-solving and generative word fluency, but cognitive flexibility was intact  Visuoperceptual abilities were within normal limits, as were the patient's language abilities and upper extremity motor speed/fine manual dexterity  There was evidence of pronounced memory dysfunction, characterized by an amnestic profile in which Ms Shorts acquisition of information was limited and there was an accelerated rate of forgetting (impaired consolidation)  This pattern was observed across memory measures, regardless of domain or modality  The patient endorsed minimal depression at this time  Results revealed a profound amnestic memory profile in the context of, otherwise, relatively preserved neurocognitive abilities, with exception for the observed mild executive dysfunction  Recent lab work and neuroimaging were unrevealing, but repeat lab work is recommended t to rule out "reversible" causes of memory dysfunction, such as metabolic disorders and/or nutritional deficiencies  Based on on the NeuroQuant analysis, albeit within normal limits, it appears that hippocampal and ventricular volumes were at the lower and upper ends of the normal range, respectively; therefore, I agree that repeat neuroimaging is also recommended for comparison  Given the severe degree of memory dysfunction and the reported functional changes, Ms Unice Riedel technically meets diagnostic criteria for dementia, and based on these findings, a neurodegenerative disease process cannot be ruled out at this time  However, it will be important to differentiate this from an amnestic disorder related to other etiologies outlined above  Ms Unice Riedel sustained a possible concussion in July, 2017, and while I do not believe these findings are related to that injury, she reported occasional dizziness, which warrants follow-up to determine if this is a positional vertigo (BPPV) vs  orthostatic symptom  Continued treatment with a medication aimed at slowing down cognitive decline seems appropriate  Ms Unice Riedel should also continue to manage vascular risk factors, through proper diet, medication, and exercise  There has been some research associating cognitive dysfunction with use of "statins;" however, this literature is mixed and results have been inconsistent   Additionally, the patient is currently treated with pravastatin, which I believe is of lower lipophilicity and tends to be better tolerated, but there may be idiosyncratic side-effects to consider  Along with managing vascular risk factors, regular mental stimulation, physical activity, and social engagement are strongly encouraged  Regarding her driving ability, there are no indications from this evaluation that would suggest the patient is unable to operate a motor vehicle in safe and reliable manner; however, perhaps a Fitness to Drive Evaluation is warranted  It is recommended that Ms Alba Troy undergo a neuropsychological re-evaluation in a year, at which time the current evaluation will serve as a baseline to monitor for interval changes  The patient has been scheduled for a feedback session, which is when we will review these findings, impressions, and recommendations

## 2018-05-04 ENCOUNTER — TELEPHONE (OUTPATIENT)
Dept: NEUROLOGY | Facility: CLINIC | Age: 78
End: 2018-05-04

## 2018-05-04 NOTE — TELEPHONE ENCOUNTER
pt's  called  exelon patch prescribed at last visit  she is tolerating medication, no side effects  he doesn't see any improvement    he states that he has 7 days left of medication and before he gets a refill he wanted to know if you wanted to increase dosage  [phone]

## 2018-05-04 NOTE — TELEPHONE ENCOUNTER
I would recommend to continue the medication for 1 more months and then switch, even if patient family sees no changes in memory

## 2018-05-30 ENCOUNTER — OFFICE VISIT (OUTPATIENT)
Dept: NEUROLOGY | Facility: CLINIC | Age: 78
End: 2018-05-30
Payer: MEDICARE

## 2018-05-30 DIAGNOSIS — G31.84 AMNESTIC MCI (MILD COGNITIVE IMPAIRMENT WITH MEMORY LOSS): Primary | ICD-10-CM

## 2018-05-30 PROCEDURE — 96118 PR NEUROPSYCH TESTING BY PSYCH/PHYS: CPT | Performed by: CLINICAL NEUROPSYCHOLOGIST

## 2018-05-31 PROBLEM — F03.90 DEMENTIA (HCC): Status: ACTIVE | Noted: 2018-03-12
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
## 2018-06-21 DIAGNOSIS — G31.84 AMNESTIC MCI (MILD COGNITIVE IMPAIRMENT WITH MEMORY LOSS): ICD-10-CM

## 2018-06-21 RX ORDER — RIVASTIGMINE 9.5 MG/24H
1 PATCH, EXTENDED RELEASE TRANSDERMAL DAILY
Qty: 30 PATCH | Refills: 1 | Status: SHIPPED | OUTPATIENT
Start: 2018-06-21 | End: 2019-01-11

## 2018-06-21 NOTE — TELEPHONE ENCOUNTER
Will increwase dose to9 5, order sent to pharmacy,  Please make pt  aware of s/e, any issues with nausea, mood, ect  Pt to call with update in 4 weeks

## 2018-06-21 NOTE — TELEPHONE ENCOUNTER
Pt's  called back regarding exelon  Pt is still tolerating and no side effects  Has not seen any changes in her memory  He is requesting the increased dose    Only has 3 patches left  [phone]

## 2018-06-21 NOTE — TELEPHONE ENCOUNTER
Called and advised pt's  of all of the below  He verbalized clear understanding of all instructions

## 2018-07-23 ENCOUNTER — OFFICE VISIT (OUTPATIENT)
Dept: NEUROLOGY | Facility: CLINIC | Age: 78
End: 2018-07-23
Payer: MEDICARE

## 2018-07-23 VITALS
SYSTOLIC BLOOD PRESSURE: 112 MMHG | DIASTOLIC BLOOD PRESSURE: 60 MMHG | HEIGHT: 67 IN | HEART RATE: 65 BPM | WEIGHT: 147 LBS | BODY MASS INDEX: 23.07 KG/M2

## 2018-07-23 DIAGNOSIS — F03.90 DEMENTIA WITHOUT BEHAVIORAL DISTURBANCE, UNSPECIFIED DEMENTIA TYPE (HCC): Primary | ICD-10-CM

## 2018-07-23 PROCEDURE — 99214 OFFICE O/P EST MOD 30 MIN: CPT | Performed by: PSYCHIATRY & NEUROLOGY

## 2018-07-23 RX ORDER — TRAMADOL HYDROCHLORIDE 50 MG/1
TABLET ORAL
COMMUNITY
Start: 2018-06-01 | End: 2018-07-23 | Stop reason: CLARIF
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
## 2018-08-17 ENCOUNTER — TELEPHONE (OUTPATIENT)
Dept: NEUROLOGY | Facility: CLINIC | Age: 78
End: 2018-08-17

## 2018-08-17 NOTE — TELEPHONE ENCOUNTER
Pt's  called to report that pt experiencing increased nausea, occasional vomiting, and dizziness (3-4x daily) since increasing Rivastigmine patch to 9 5mg approx 5 weeks ago  Denies illness or new meds or med changes  Pt staying hydrated but has decreased appetite  Please advise      Michael Almaguer [phone]

## 2018-08-18 DIAGNOSIS — G30.1 LATE ONSET ALZHEIMER'S DISEASE WITHOUT BEHAVIORAL DISTURBANCE (HCC): Primary | ICD-10-CM

## 2018-08-18 DIAGNOSIS — F02.80 LATE ONSET ALZHEIMER'S DISEASE WITHOUT BEHAVIORAL DISTURBANCE (HCC): Primary | ICD-10-CM

## 2018-08-18 RX ORDER — RIVASTIGMINE 4.6 MG/24H
1 PATCH, EXTENDED RELEASE TRANSDERMAL DAILY
Qty: 30 PATCH | Refills: 2 | Status: SHIPPED | OUTPATIENT
Start: 2018-08-18 | End: 2019-01-16 | Stop reason: SDUPTHER

## 2018-08-18 NOTE — TELEPHONE ENCOUNTER
Rivastigmine 4 6 mg/24 h patch script was sent to her pharmacy  Patient must follow with primary team to rule out other causes of vomiting/nausea

## 2018-08-23 ENCOUNTER — APPOINTMENT (OUTPATIENT)
Dept: OCCUPATIONAL THERAPY | Facility: CLINIC | Age: 78
End: 2018-08-23
Payer: MEDICARE

## 2018-09-11 ENCOUNTER — TELEPHONE (OUTPATIENT)
Dept: NEUROLOGY | Facility: CLINIC | Age: 78
End: 2018-09-11

## 2018-09-11 NOTE — TELEPHONE ENCOUNTER
Concern from 2 physicians is enough to initiate Driving evaluation - we are not obligated to send the patient to FTD, we just report to [employer] as per the protocol      If family confident she has no issues driving, there should not be any fear or concern she is not passing her driving test      At any point - [employer] form is filled out and it will be per family decision to proceed with formal driving test

## 2018-09-11 NOTE — TELEPHONE ENCOUNTER
called regarding patient's fitness to drive eval, sched 1/12     Patient's  states he's not "comfortable with her taking the driving evaluation" He states he's concerned about the questions they will ask the patient and that the patient won't know all the answers  He states that she is a capable  and doesn't need a fitness to drive eval  "I think it's a waste of my time and her time"   "I think she's a safe  and if I didn't, I wouldn't let her drive   Dr Ana Jimenes doesn't think it's necessary"  Requesting to speak to Dr Sangita Espinoza,

## 2018-09-12 NOTE — TELEPHONE ENCOUNTER
Anurag Ngo made aware  He is very upset and still does not feel FTD is necessary  I explained again that FTD is optional and that we did fax form to Felix who will make final determination  He verbalized understanding

## 2018-09-19 ENCOUNTER — OFFICE VISIT (OUTPATIENT)
Dept: OCCUPATIONAL THERAPY | Facility: CLINIC | Age: 78
End: 2018-09-19
Payer: MEDICARE

## 2018-09-19 DIAGNOSIS — F03.90 DEMENTIA WITHOUT BEHAVIORAL DISTURBANCE, UNSPECIFIED DEMENTIA TYPE (HCC): Primary | ICD-10-CM

## 2018-09-19 PROCEDURE — G8992 OTHER PT/OT  D/C STATUS: HCPCS

## 2018-09-19 PROCEDURE — G8991 OTHER PT/OT GOAL STATUS: HCPCS

## 2018-09-19 PROCEDURE — 96125 COGNITIVE TEST BY HC PRO: CPT

## 2018-09-19 PROCEDURE — G8990 OTHER PT/OT CURRENT STATUS: HCPCS

## 2018-09-19 PROCEDURE — 97165 OT EVAL LOW COMPLEX 30 MIN: CPT
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
## 2018-09-24 ENCOUNTER — TELEPHONE (OUTPATIENT)
Dept: NEUROLOGY | Facility: CLINIC | Age: 78
End: 2018-09-24

## 2018-09-24 NOTE — TELEPHONE ENCOUNTER
FTD results:      DCAT: (see attached report for further details) Pt scoring an 90% likelihood on failing on road     I will help with paperwork once in [employer]

## 2018-09-24 NOTE — TELEPHONE ENCOUNTER
pt's  called regarding a notice they received from [facility]  this was a notice of recall of her driving priviledges  states that she just took driving test last thursday and letter received on saturday  she is   [facility]  there was also another cognitive impairment form with that letter  please review FTD results from 9/19 and advise     [phone]

## 2018-09-27 NOTE — TELEPHONE ENCOUNTER
Case discussed with the patient  - please mail Fitness to drive test to the patient  Guera Perez was raising his voice and apologizing at the same time due to current situation       We discussed that it is NOT neurology who make final decision - it is [employer] team       will bring forms from [facility] shortly

## 2018-09-27 NOTE — TELEPHONE ENCOUNTER
Pt's  Yingroseline Stallings called asking to speak w/ Dr Jimbo Macias re: FTD results  Advised him again of the results and below  Ying Stallings stated, "I'm expecting a call from Dr Jimbo Macias with detailed explanation  I know my wife and she's a safe "  He states that pt needs to surrender her license by Saturday  Thank you        [address] [facility]

## 2018-10-01 NOTE — TELEPHONE ENCOUNTER
I extensively discussed and explained this issue - patient is raising his voice over the phone and repeats himself all over again  I will not be able to continue discussing case over the phone anymore  I will be glad to fill [employer] form and I please scheduled follow up visit to discuss the [employer]wn Dot guidelines during the visit in the presence of MSW Mrs Muse Stager and /or management

## 2018-10-01 NOTE — TELEPHONE ENCOUNTER
pt's  called and states that he has a cognitive form that he would like to fax to our office and have completed  i gave him our fax number  he would like to discuss the form with you once filled out   he states that the FTD test is garbage and the only issue his wife has is her memory  he would like to know why she has to have her license taken away    he will fax form today to [phone]

## 2018-10-15 NOTE — TELEPHONE ENCOUNTER
Cognitive impairment form received, partially filled out placed in your folder for completion  Please send back when completed and we can call for sooner appt

## 2018-10-17 NOTE — TELEPHONE ENCOUNTER
Called, patient picked up, seemed confused as to why I was calling, she will have her  return the call  The [employer] form was filled out, faxed to [employer] and central faxing (placed in the folder on my desk if needed sooner for any reason)  Was calling to move up appt as requested, once appt moved up will need to send to Kaitlyn Mason so she can coordinate attending the appt as requested

## 2018-12-03 ENCOUNTER — HOSPITAL ENCOUNTER (OUTPATIENT)
Dept: MRI IMAGING | Facility: HOSPITAL | Age: 78
Discharge: HOME | End: 2018-12-03
Attending: PSYCHIATRY & NEUROLOGY
Payer: MEDICARE

## 2018-12-03 DIAGNOSIS — F03.90 DEMENTIA WITHOUT BEHAVIORAL DISTURBANCE, UNSPECIFIED DEMENTIA TYPE (HCC): ICD-10-CM

## 2018-12-03 PROCEDURE — 70551 MRI BRAIN STEM W/O DYE: CPT

## 2019-01-11 ENCOUNTER — OFFICE VISIT (OUTPATIENT)
Dept: NEUROLOGY | Facility: CLINIC | Age: 79
End: 2019-01-11
Payer: MEDICARE

## 2019-01-11 VITALS
HEART RATE: 65 BPM | HEIGHT: 67 IN | SYSTOLIC BLOOD PRESSURE: 160 MMHG | WEIGHT: 147 LBS | DIASTOLIC BLOOD PRESSURE: 79 MMHG | BODY MASS INDEX: 23.07 KG/M2

## 2019-01-11 DIAGNOSIS — G30.1 LATE ONSET ALZHEIMER'S DISEASE WITHOUT BEHAVIORAL DISTURBANCE (HCC): Primary | ICD-10-CM

## 2019-01-11 DIAGNOSIS — G35 MS (MULTIPLE SCLEROSIS) (HCC): Primary | ICD-10-CM

## 2019-01-11 DIAGNOSIS — F02.80 LATE ONSET ALZHEIMER'S DISEASE WITHOUT BEHAVIORAL DISTURBANCE (HCC): Primary | ICD-10-CM

## 2019-01-11 PROBLEM — G30.9 ALZHEIMER'S DEMENTIA WITHOUT BEHAVIORAL DISTURBANCE (HCC): Status: ACTIVE | Noted: 2018-07-23

## 2019-01-11 PROCEDURE — 99214 OFFICE O/P EST MOD 30 MIN: CPT | Performed by: PSYCHIATRY & NEUROLOGY

## 2019-01-11 RX ORDER — MEMANTINE HYDROCHLORIDE 10 MG/1
TABLET ORAL
Qty: 60 TABLET | Refills: 0 | Status: SHIPPED | OUTPATIENT
Start: 2019-01-11 | End: 2019-04-11 | Stop reason: SINTOL

## 2019-01-11 RX ORDER — MEMANTINE HYDROCHLORIDE 5 MG/1
TABLET ORAL
Qty: 120 TABLET | Refills: 0 | Status: SHIPPED | OUTPATIENT
Start: 2019-01-11 | End: 2019-04-11 | Stop reason: SINTOL

## 2019-01-11 RX ORDER — ALENDRONATE SODIUM 70 MG/1
TABLET ORAL
COMMUNITY
Start: 2018-11-05

## 2019-01-11 RX ORDER — MEMANTINE HYDROCHLORIDE 10 MG/1
10 TABLET ORAL 2 TIMES DAILY
Qty: 180 TABLET | Refills: 3 | Status: SHIPPED | OUTPATIENT
Start: 2019-01-11 | End: 2019-04-11 | Stop reason: SINTOL

## 2019-01-11 NOTE — TELEPHONE ENCOUNTER
Call from [address] re the memantine titration  They need a new memantine rx 10mg to finish the 5mg titration, insurance will not pay for the quantity on the 5mg rx  If agreeable please sign off

## 2019-01-11 NOTE — PROGRESS NOTES
Patient ID: Mahi Corrales is a 66 y o  female  Assessment/Plan:     Problem List Items Addressed This Visit        Nervous and Auditory    Alzheimer's dementia without behavioral disturbance - Primary    Relevant Medications    memantine (NAMENDA) 5 mg tablet    memantine (NAMENDA) 10 mg tablet         Today I had the pleasure of seeing your Lynda Painting theoyer the the was quite a , in consultation at [address]  Mrs Hector Aguilar has presented for follow up on cognitive dysfunction and related issues  Patient continues reporting only short-term memory issues with no other significant deficiencies in her function  Patient has intact orientation, or visual spatial functioning, no parkinsonism has been reported  Patient had completed fitness to drive test with DCAT score 90% likelihood of failing on road test   Dr Da Silva was previously evaluating the patient and he confirmed that she has clinical features of dementia  Patient has been on rivastigmine 4 6 mg/24 h TD patch- she was not able to tolerate increased dose  Patient had completed brain MRI neuro quadrant this month and her findings were compared to 2018- progressive hyppocampal volume loss in 9 month interval has been noted  Patient was advised to start slow titration of memantine, and then continue memantine 10 milligrams twice daily as maintenance  Mini-mental status exam score is 26/30 with 0/3 recall  No new focal neurological deficit on her exam has been noted  Patient describes herself as feeling more depressed-potential addition SSRI might be of benefit once she reaches therapeutic level of memantine  Subjective: cognitive dysfunction    HPI/History of Present Illness  Mrs Hector Aguilar has a history of bilateral carotid bruit, bilateral hearing loss, hypertension, bilateral cataract, mitral and aortic heart valve disease, hyperlipidemia, osteoporosis, vertigo, vitamin-D deficiency,who presented for follow up on her cognitive dysfunction  Patient was described as feeling more depressed, with more short-term memory issues has been noted  No significant deterioration has been reported though patient and her  has been more concerned about her cognitive function  Patient was advised to increase dose of rivastigmine- Rivastigmine 9 5 mg caused worsening dizziness and medication was decreased to previous dose  Patient has intact ADLs, normal orientation  Patient had completed her brain MRI and she is here today to discuss her findings  The following portions of the patient's history were reviewed and updated as appropriate:   She  has a past medical history of Hyperlipidemia; Hypertension; and Memory loss  She   Patient Active Problem List    Diagnosis Date Noted    Alzheimer's dementia without behavioral disturbance 07/23/2018    Dementia 03/12/2018     She  has a past surgical history that includes Replacement total knee and Elbow surgery  Her family history includes Depression in her mother; Heart attack in her brother and father; Stroke in her mother; Thyroid disease in her mother  She  reports that she has never smoked  She has never used smokeless tobacco  She reports that she does not drink alcohol or use drugs    Current Outpatient Prescriptions   Medication Sig Dispense Refill    cyanocobalamin ([employer] VITAMIN B-12) 1000 MCG tablet Take 1,000 mcg by mouth      folic acid (FOLVITE) 1 mg tablet Take 1 tablet by mouth      Ginkgo Biloba 400 MG CAPS Bid or tid      lisinopril (ZESTRIL) 2 5 mg tablet Take 1 tablet by mouth daily      Multiple Vitamins-Iron (DAILY LEROY MULTIVITAMIN/IRON) TABS Take 1 tablet by mouth      pravastatin (PRAVACHOL) 40 mg tablet Take 40 mg by mouth daily      rivastigmine (EXELON) 4 6 mg/24 hr TD 24 hr patch Place 1 patch on the skin daily 30 patch 2    alendronate (FOSAMAX) 70 mg tablet Take 70 mg by mouth Once a week      alendronate (FOSAMAX) 70 mg tablet       aspirin 81 MG tablet Take 81 mg by mouth      Cholecalciferol 2000 units CAPS 1/d      DOCOSAHEXAENOIC ACID PO Take 1,000 mg by mouth      meclizine (ANTIVERT) 25 mg tablet Take 1 tablet by mouth every 8 (eight) hours      memantine (NAMENDA) 10 mg tablet Take 1 tablet (10 mg total) by mouth 2 (two) times a day 180 tablet 3    memantine (NAMENDA) 5 mg tablet 1 tab at night for 7 days, then 1 tab bid for 7 d, then 1 tab in am 2 at night for 7 d, then 2 tab bid 120 tablet 0    Methylprednisolone 4 MG TBPK TK UTD  0    triamcinolone (KENALOG) 0 1 % cream DARIUS AA TID PRN  0     No current facility-administered medications for this visit  Current Outpatient Prescriptions on File Prior to Visit   Medication Sig    cyanocobalamin (CVS VITAMIN B-12) 1000 MCG tablet Take 1,000 mcg by mouth    folic acid (FOLVITE) 1 mg tablet Take 1 tablet by mouth    lisinopril (ZESTRIL) 2 5 mg tablet Take 1 tablet by mouth daily    Multiple Vitamins-Iron (DAILY LEROY MULTIVITAMIN/IRON) TABS Take 1 tablet by mouth    pravastatin (PRAVACHOL) 40 mg tablet Take 40 mg by mouth daily    rivastigmine (EXELON) 4 6 mg/24 hr TD 24 hr patch Place 1 patch on the skin daily    alendronate (FOSAMAX) 70 mg tablet Take 70 mg by mouth Once a week    aspirin 81 MG tablet Take 81 mg by mouth    Cholecalciferol 2000 units CAPS 1/d    DOCOSAHEXAENOIC ACID PO Take 1,000 mg by mouth    meclizine (ANTIVERT) 25 mg tablet Take 1 tablet by mouth every 8 (eight) hours    Methylprednisolone 4 MG TBPK TK UTD    triamcinolone (KENALOG) 0 1 % cream DARIUS AA TID PRN    [DISCONTINUED] rivastigmine (EXELON) 9 5 mg/24 hr TD 24 hr patch Place 1 patch on the skin daily     No current facility-administered medications on file prior to visit  She is allergic to acetaminophen-codeine and ciprofloxacin            Objective:    Blood pressure 160/79, pulse 65, height 5' 7" (1 702 m), weight 66 7 kg (147 lb)      Physical Exam    Neurological Exam      ROS:    Review of Systems   Constitutional: Negative  HENT: Negative  Eyes: Negative  Respiratory: Negative  Cardiovascular: Negative  Gastrointestinal: Negative  Endocrine: Negative  Genitourinary: Negative  Musculoskeletal: Positive for arthralgias  Skin: Negative  Allergic/Immunologic: Negative  Neurological: Negative  Hematological: Negative  Psychiatric/Behavioral: Negative           Depression

## 2019-01-16 DIAGNOSIS — F02.80 LATE ONSET ALZHEIMER'S DISEASE WITHOUT BEHAVIORAL DISTURBANCE (HCC): ICD-10-CM

## 2019-01-16 DIAGNOSIS — G30.1 LATE ONSET ALZHEIMER'S DISEASE WITHOUT BEHAVIORAL DISTURBANCE (HCC): ICD-10-CM

## 2019-01-16 RX ORDER — RIVASTIGMINE 4.6 MG/24H
1 PATCH, EXTENDED RELEASE TRANSDERMAL DAILY
Qty: 30 PATCH | Refills: 3 | Status: SHIPPED | OUTPATIENT
Start: 2019-01-16 | End: 2019-07-15 | Stop reason: SDUPTHER

## 2019-04-11 ENCOUNTER — OFFICE VISIT (OUTPATIENT)
Dept: NEUROLOGY | Facility: CLINIC | Age: 79
End: 2019-04-11
Payer: MEDICARE

## 2019-04-11 VITALS
BODY MASS INDEX: 23.39 KG/M2 | SYSTOLIC BLOOD PRESSURE: 138 MMHG | DIASTOLIC BLOOD PRESSURE: 70 MMHG | WEIGHT: 149 LBS | HEIGHT: 67 IN | HEART RATE: 72 BPM

## 2019-04-11 DIAGNOSIS — R63.0 DECREASED APPETITE: ICD-10-CM

## 2019-04-11 DIAGNOSIS — F03.90 DEMENTIA WITHOUT BEHAVIORAL DISTURBANCE, UNSPECIFIED DEMENTIA TYPE (HCC): Primary | ICD-10-CM

## 2019-04-11 DIAGNOSIS — G60.9 IDIOPATHIC PERIPHERAL NEUROPATHY: ICD-10-CM

## 2019-04-11 PROBLEM — B02.9 THIGH SHINGLES: Status: ACTIVE | Noted: 2018-05-31

## 2019-04-11 PROBLEM — M19.121 POST-TRAUMATIC OSTEOARTHRITIS OF RIGHT ELBOW: Status: ACTIVE | Noted: 2017-01-11

## 2019-04-11 PROBLEM — N17.9 AKI (ACUTE KIDNEY INJURY) (HCC): Status: ACTIVE | Noted: 2018-05-31

## 2019-04-11 PROBLEM — R07.89 CHEST PAIN, ATYPICAL: Status: ACTIVE | Noted: 2018-05-31

## 2019-04-11 PROBLEM — R42 DIZZY SPELLS: Status: ACTIVE | Noted: 2018-07-17

## 2019-04-11 PROBLEM — H91.93 BILATERAL HEARING LOSS: Status: ACTIVE | Noted: 2017-11-24

## 2019-04-11 PROCEDURE — 99215 OFFICE O/P EST HI 40 MIN: CPT | Performed by: PSYCHIATRY & NEUROLOGY

## 2019-04-12 ENCOUNTER — TELEPHONE (OUTPATIENT)
Dept: NEUROLOGY | Facility: CLINIC | Age: 79
End: 2019-04-12

## 2019-04-19 LAB
25(OH)D3 SERPL-MCNC: 34 NG/ML (ref 30–100)
ALBUMIN SERPL-MCNC: 4.2 G/DL (ref 3.6–5.1)
ALBUMIN/GLOB SERPL: 1.4 (CALC) (ref 1–2.5)
ALP SERPL-CCNC: 77 U/L (ref 33–130)
ALT SERPL-CCNC: 9 U/L (ref 6–29)
AST SERPL-CCNC: 14 U/L (ref 10–35)
BILIRUB SERPL-MCNC: 0.5 MG/DL (ref 0.2–1.2)
BUN SERPL-MCNC: 21 MG/DL (ref 7–25)
BUN/CREAT SERPL: 21 (CALC) (ref 6–22)
CALCIUM SERPL-MCNC: 10.3 MG/DL (ref 8.6–10.4)
CHLORIDE SERPL-SCNC: 105 MMOL/L (ref 98–110)
CO2 SERPL-SCNC: 28 MMOL/L (ref 20–32)
CREAT SERPL-MCNC: 1.02 MG/DL (ref 0.6–0.93)
FOLATE SERPL-MCNC: 14.2 NG/ML
GLOBULIN SER CALC-MCNC: 3 G/DL (CALC) (ref 1.9–3.7)
GLUCOSE SERPL-MCNC: 84 MG/DL (ref 65–99)
MAGNESIUM SERPL-MCNC: 2 MG/DL (ref 1.5–2.5)
POTASSIUM SERPL-SCNC: 4.5 MMOL/L (ref 3.5–5.3)
PROT SERPL-MCNC: 7.2 G/DL (ref 6.1–8.1)
SL AMB EGFR AFRICAN AMERICAN: 61 ML/MIN/1.73M2
SL AMB EGFR NON AFRICAN AMERICAN: 53 ML/MIN/1.73M2
SODIUM SERPL-SCNC: 140 MMOL/L (ref 135–146)
TSH SERPL-ACNC: 1.97 MIU/L (ref 0.4–4.5)
VIT B1 BLD-SCNC: 133 NMOL/L (ref 78–185)
VIT B12 SERPL-MCNC: 1338 PG/ML (ref 200–1100)

## 2019-04-22 ENCOUNTER — TELEPHONE (OUTPATIENT)
Dept: NEUROLOGY | Facility: CLINIC | Age: 79
End: 2019-04-22

## 2019-07-15 DIAGNOSIS — F02.80 LATE ONSET ALZHEIMER'S DISEASE WITHOUT BEHAVIORAL DISTURBANCE (HCC): ICD-10-CM

## 2019-07-15 DIAGNOSIS — G30.1 LATE ONSET ALZHEIMER'S DISEASE WITHOUT BEHAVIORAL DISTURBANCE (HCC): ICD-10-CM

## 2019-07-15 RX ORDER — MEMANTINE HYDROCHLORIDE 10 MG/1
10 TABLET ORAL DAILY
Qty: 30 TABLET | Refills: 3 | Status: SHIPPED | OUTPATIENT
Start: 2019-07-15 | End: 2019-12-18 | Stop reason: SDUPTHER

## 2019-07-15 RX ORDER — RIVASTIGMINE 4.6 MG/24H
1 PATCH, EXTENDED RELEASE TRANSDERMAL DAILY
Qty: 30 PATCH | Refills: 3 | Status: SHIPPED | OUTPATIENT
Start: 2019-07-15 | End: 2019-12-18 | Stop reason: SDUPTHER

## 2019-08-13 NOTE — PROGRESS NOTES
DEPARTMENT OF NEUROLOGICAL SCIENCES  [address] and [facility]        RETURN PATIENT NOTE    Patient: Rob Franco  Medical Record Number: # [id number]  YOB: 1940  Date of visit: 8/14/2019    Referring provider: No ref  provider found    ASSESSMENT     Diagnoses for this encounter:  1  Late onset Alzheimer's disease without behavioral disturbance        Impression of this 65 yo lady here for follow-up of multifactorial memory issues and mostly stable since the last four months  She is tolerating the Exelon patch in current dose, but higher doses of memantine, especially in the day, makes her dizzy  MOCA today was 23/30 with 0/5 word recall  She had prior MMSE in Jan 2019 of 26/30  Given her subjective stability, her medications may be helping her and do not pose a burden or intolerable side effects for now  We will continue them as the plan below  No parkinsonism - slightly more rigidity but no bradykinesia or tremor  No behavior changes or hallucinations  PLAN     · Continue the Exelon patch dose as before   · For memantine, she can take 1/2 tablet (5mg) in the morning and 1 tab (10mg) in evening to see if further benefit can be gained, and this would be the maximum dose tolerated  · She will increase the amount of water she takes in a day  · Neuroimaging reviewed in system, MRI NeuroQuant from March and Dec 2018  No new imaging needed  · She will continue to refrain from driving  · Encouraged to remain both physically and mentally active, including crossword puzzles, brain teasers  Online resources at [employer] for cognitive training games for free  · The patient has been instructed to call us about any new neurological problems or medication side effects  · Return to Clinic in 4 months  A total of 40 minutes were spent face-to-face with this patient, of which 25% was spent on counseling and coordination of care   We discussed the natural history of the patient's condition, differential diagnosis, level of diagnostic certainty, treatment alternatives and their side effects and possible complications  HISTORY OF PRESENT ILLNESS:     Ms Janet Guadalupe is a 66 y o  right handed female with history of bilateral carotid bruit, bilateral hearing loss, hypertension, bilateral cataract, mitral and aortic heart valve disease, hyperlipidemia, osteoporosis, vertigo, vitamin-D deficiency, who [facility] to the [facility] [address] [place] [address] for transfer of care and evaluation of memory issues  Last visit 4/11/19  The patient was accompanied today  History was obtained from patient and spouse    Interim History  Lab results were normal except for borderline low vitamin D; supplementation advised  Her  feels that her memory has been fairly stable  She is still on Exelon patch 4 6mg and memantine 10mg at night only  On 10mg memantine in the morning, she had dizziness  She remains not driving  They have no other questions today  Sleeping is good, she has balanced meals with some dessert, physical activity mostly consisting of housework  She does do Sudoku and crossword puzzles  INITIAL HISTORY  Main bothersome neurological symptoms today are:   1  Poor memory  Pt followed previously by Dr Clayton Segovia since 2018, last visit 1/11/19  Per chart review and confirmed by patient below:   She originally presented to [facility]) Neuro as 2nd opinion for evaluation of her cognitive function  Patient was previousy evaluated by [facility] Neurology team Dr Char Alicea 12/07/17  She endorsed short-term memory loss, forgetting some household functions, difficulties with directions, difficulties driving, starting since early-mid 2017 with some progression  Patient described feeling depressed on and off with no suicidal ideation has been noted  Patient requires no assistance with ADLs    She has no delusion or hallucination, no signs of Parkinson's disease or abnormal ambulatory function  Patient had completed brain MRI in [facility] in November 2017 which suggests no stroke no focal brain volume loss  Patient has been working with audiologist for hearing loss, and recommended hearing aids  Pt herself has refused  Patient had MMSE completed in Jan 2018 and she scored 27/30, and her mini-mental status done at [facility] was 28/30  Patient was able to recall 1/3 words  MRI brain Neuroquant showed hippocampal volume loss at 11%, which is low normal, but significant enough to consider Alzheimer's dementia in case of progression of cognitive dysfunction  Patient was asked to start Exelon ER 4 6 mg patch, and she is to start cognitive therapy  She had Neuropsych evaluation with Dr Castro Wilburn on 5/2018 confirmed profound amnestic memory profile meeting the definition of dementia but without clear neurodegenerative processes  Recommendation of repeating MRI and Neuropsychological evaluation  FTD evaluation showed pt scoring an 90% likelihood on failing on road  Repeat MRI NeuroQuant showed progressive hyppocampal volume loss in 9 month interval   Pt thus advised to start slow titration of memantine, and then continue memantine 10 milligrams twice daily as maintenance  MMSE as of Jan 2019 was 26/30 with 0/3 recall  She had since increased the memantine to 15mg qDay but felt dizzy and unable to tolerate and then discontinued it abruptly   says she often forgets to take her pills and skipping breakfast, mainly because she does not feel very hungry  She has appetite more for ice cream and a modest weight loss in the last year  She has snacks moreso than regular meals  She drinks at most 2 glasses of milk a day, and total fluid intake is less than 32 oz  Physically she is more active with her chores around the home - laundry, cleaning       Current Relevant Medications:   - Exelon patch 4 6mg/qDay    Living Situation + ADLs: lives at home with , with various members of family living in close proximity  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 not helping out with it, leaving daily operations to her children  HS graduate  She cooks, and she is driving without a 's license  She was more active in doing the finances but now  does it more due to her impaired concentration  REVIEW OF PAST MEDICAL, SOCIAL AND FAMILY HISTORY:  This is the list of problems as per our Medical Records:    Patient Active Problem List    Diagnosis Date Noted    Alzheimer's dementia without behavioral disturbance 07/23/2018    Dizzy spells 07/17/2018    KIRSTIE (acute kidney injury) ([facility] [address] ) 05/31/2018    Chest pain, atypical 05/31/2018    Thigh shingles 05/31/2018    Dementia 03/12/2018    Bilateral hearing loss 11/24/2017    Post-traumatic osteoarthritis of right elbow 01/11/2017    Gallbladder calculus without cholecystitis 07/27/2016    Bilateral carotid bruits 06/29/2016    Change in bowel habits 06/29/2016    Mitral and aortic valve disease 06/29/2016    Cataract 06/01/2016    Essential hypertension 06/01/2016    Mixed dyslipidemia 06/01/2016    Risk for falls 06/01/2016    Vitamin D deficiency disease 06/01/2016    Osteoporosis 09/29/2014     Allergies   Allergen Reactions    Acetaminophen-Codeine      Chest pain    Ciprofloxacin Hives     Action Taken: unknown;        Outpatient Encounter Medications as of 8/14/2019   Medication Sig Dispense Refill    alendronate (FOSAMAX) 70 mg tablet       Cholecalciferol 2000 units CAPS Take 2 capsules by mouth daily       cyanocobalamin ([employer] VITAMIN B-12) 1000 MCG tablet Take 1,000 mcg by mouth      Ginkgo Biloba 400 MG CAPS Bid or tid      lisinopril (ZESTRIL) 2 5 mg tablet Take 1 tablet by mouth daily      memantine (NAMENDA) 10 mg tablet Take 1 tablet (10 mg total) by mouth daily 30 tablet 3    Multiple Vitamins-Iron (DAILY LEROY MULTIVITAMIN/IRON) TABS Take 1 tablet by mouth      pravastatin (PRAVACHOL) 40 mg tablet Take 40 mg by mouth daily      rivastigmine (EXELON) 4 6 mg/24 hr TD 24 hr patch Place 1 patch on the skin daily 30 patch 3    alendronate (FOSAMAX) 70 mg tablet Take 70 mg by mouth Once a week      [DISCONTINUED] aspirin 81 MG tablet Take 81 mg by mouth      [DISCONTINUED] DOCOSAHEXAENOIC ACID PO Take 1,000 mg by mouth      [DISCONTINUED] folic acid (FOLVITE) 1 mg tablet Take 1 tablet by mouth      [DISCONTINUED] triamcinolone (KENALOG) 0 1 % cream DARIUS AA TID PRN  0     No facility-administered encounter medications on file as of 8/14/2019  REVIEW OF SYSTEMS:  The patient has entered data on an intake form regarding present illness, past medical and surgical history, medications, allergies, family and social history, and a full review of 14 systems  I have reviewed this form with the patient, and all the relevant information has been included on this note  The full review of systems was negative except as stated in HPI and below  Constitutional: Negative  Negative for appetite change and fever  HENT: Negative  Negative for hearing loss, tinnitus, trouble swallowing and voice change  Eyes: Negative  Negative for photophobia and pain  Respiratory: Negative  Negative for shortness of breath  Cardiovascular: Negative  Negative for palpitations  Gastrointestinal: Negative  Negative for nausea and vomiting  Endocrine: Negative  Negative for cold intolerance and heat intolerance  Genitourinary: Negative  Negative for dysuria, frequency and urgency  Musculoskeletal: Negative  Negative for myalgias and neck pain  Skin: Negative  Negative for rash  Neurological: Positive for dizziness  Negative for tremors, seizures, syncope, facial asymmetry, speech difficulty, weakness, light-headedness, numbness and headaches  Memory is not getting any better   Hematological: Negative  Does not bruise/bleed easily  Psychiatric/Behavioral: Positive for confusion   Negative for hallucinations and sleep disturbance  FOCUSED PHYSICAL EXAMINATION:     Vital signs:  /73 (BP Location: Left arm, Patient Position: Sitting, Cuff Size: Standard)   Pulse 76   Ht 5' 7" (1 702 m)   Wt 67 6 kg (149 lb)   BMI 23 34 kg/m²     General:  Well-appearing, well nourished, pleasant patient in no acute distress  Mood and Fund of Knowledge are appropriate  Head:  Normocephalic, atraumatic  Oropharynx and conjunctiva are clear  Speech  No hypophonia, no bradylalia  No scanning speech  Language: Comprehension intact  Neck:  Supple, strong 5/5 forward flexion and retroflexion  Extremities: Range of motion is normal       Cognitive and Mental Exam:   MOCA previously was 26/30 in Jan 2019    [address] [facility]   Visuospatial  ----------   Trails A 1 1   Cube Drawing 1 1   Clock Drawing 3 3   Naming Objects 3 3   Attention  ----------   Digit Span 1 2   Letter Reading 1 1   Serial 7s 1 3   Language  ----------   Repetition 2 2   Fluency 1 1   Abstraction 2 2   Delayed Recall 0 5   Orientation                 6             6              22 30   +1 for HS education = 23    She has some hesitancy free listing out animals  Cranial Nerves:  CN II:  Direct and consensual light reflexes were equally reactive to light symmetrically  No afferent pupillary defect   Visual fields are full to confrontation  CN III / IV / VI: Extraocular movements were full, with normal pursuit and saccades  CN V:   Facial sensation to light touch was intact  CN VII: Face is symmetric with normal strength  CN VIII: Hearing was assessed using the Calibrated Finger Rub Auditory Screening Test (CALFRAST) and was not abnormal (Better than CALFRAST-Strong-70)  CN X:   Palate is up going bilaterally and symmetrically  CN XI:  Neck muscles are strong  CN XII: Tongue protrusion is at midline with normal movements  No dysarthria  Motor:    Dystonia: none  Dyskinesia: none  Myoclonus: none    Chorea: none   Tics: none  [id number]                Time since last dose:   4/11/19 8/14/19   Speech  1  2   Facial Expression  2 2   Postural Tremor (Right) 0 0   Postural Tremor (Left) 0 0   Kinetic Tremor (Right)  0 0   Kinetic Tremor (Left)  0 0   Rest tremor amplitude RUE 0 0   Rest tremor amplitude LUE 0 0   Rest tremor amplitude RLE 0 0   Rest tremor amplitude LLE 0 0   Lip/Jaw Tremor  0 0   Consistency of tremor 0 0   Finger Taps (Right)   1 1   Finger Taps (Left)  1 1   Hand Movement (Right)  0 0   Hand Movement (Left)   0 0   Pronation/Supination (Right)  0 0   Pronation/Supination (Left)   0 0   Toe Tapping (Right) 1 -   Toe Tapping (Left) 1 -   Leg Agility (Right)  1 -   Leg Agility (Left)   1  -   Rigidity - Neck  2 (complicated by the contraction of the elbow, chronic)  2   Rigidity - Upper Extremity (Right)  2   2   Rigidity - Upper Extremity (Left)   0  2   Rigidity - Lower Extremity (Right)  0 2   Rigidity - Lower Extremity (Left)   0 2   Arising from Chair   0  0    Gait   0  2   Freezing of Gait 1  1   Postural Stability  -  -   Posture 0  0   Global spontaneity of movement 0  0   Hammertoes in the R foot, surgically operated on before but splayed out  -------------------------------------------------------------------------------------    Muscle Strength Right Left  Muscle Strength Right Left   Deltoid 5/5 5/5  Hip Adductors 5/5 5/5   Biceps 5/5 5/5  Hip Abductors 5/5 5/5   Triceps 5/5 5/5  Knee Extensors 5/5 5/5   Wrist Extensors 5/5 5/5  Knee Flexors 5/5 5/5   Wrist Flexors 5/5 5/5  Ankle Extensors 5/5 5/5    5/5 5/5  Ankle Flexors 5/5 5/5   Finger Abductors 5/5 5/5       Hip Flexors 5/5 5/5   Hip Extensors 5/5 5/5     Sensory  Intact to Light Touch, Temperature, and vibration sense in all extremities EXCEPT for the RUE with mildly decreased sensation     Coordination:  Finger-to-nose-finger: normal     Gait:  Normal comprehensive gait evaluation, has normal raising, stance, gait, turns   Feet are externally rotated on forward gait  No unsteadiness or tremors        Reflexes:    Right Left   Biceps 2/4 2/4   Brachioradialis 2/4 2/4   Triceps 1/4 1/4   Knee 1/4 1/4   Ankle 1/4 1/4

## 2019-08-14 ENCOUNTER — OFFICE VISIT (OUTPATIENT)
Dept: NEUROLOGY | Facility: CLINIC | Age: 79
End: 2019-08-14
Payer: MEDICARE

## 2019-08-14 VITALS
HEART RATE: 76 BPM | SYSTOLIC BLOOD PRESSURE: 158 MMHG | BODY MASS INDEX: 23.39 KG/M2 | WEIGHT: 149 LBS | HEIGHT: 67 IN | DIASTOLIC BLOOD PRESSURE: 73 MMHG

## 2019-08-14 DIAGNOSIS — F02.80 LATE ONSET ALZHEIMER'S DISEASE WITHOUT BEHAVIORAL DISTURBANCE (HCC): Primary | ICD-10-CM

## 2019-08-14 DIAGNOSIS — G30.1 LATE ONSET ALZHEIMER'S DISEASE WITHOUT BEHAVIORAL DISTURBANCE (HCC): Primary | ICD-10-CM

## 2019-08-14 PROCEDURE — 99215 OFFICE O/P EST HI 40 MIN: CPT | Performed by: PSYCHIATRY & NEUROLOGY

## 2019-08-14 NOTE — LETTER
August 14, 2019     Isela Nails DO   [facility]  [address] [place] [address]    Patient: Molina Fabian   YOB: 1940   Date of Visit: 8/14/2019       Dear Dr Benjamin [place]:    Earlier today I saw Mrs Lina Slaughter for follow-up of her dementia  Below are my notes for this visit for your records and to keep you updated on her health status  If you have questions, please do not hesitate to call me  I look forward to following your patient along with you  Sincerely,        Stevie Alexandre MD        CC: No Recipients  Stevie Alexandre MD  8/14/2019 11:55 PM  Sign at close encounter  [address] [facility] PATIENT NOTE    Patient: Lyn Guo7 Record Number: # [id number]  YOB: 1940  Date of visit: 8/14/2019    Referring provider: No ref  provider found    ASSESSMENT     Diagnoses for this encounter:  1  Late onset Alzheimer's disease without behavioral disturbance        Impression of this 65 yo lady here for follow-up of multifactorial memory issues and mostly stable since the last four months  She is tolerating the Exelon patch in current dose, but higher doses of memantine, especially in the day, makes her dizzy  MOCA today was 23/30 with 0/5 word recall  She had prior MMSE in Jan 2019 of 26/30  Given her subjective stability, her medications may be helping her and do not pose a burden or intolerable side effects for now  We will continue them as the plan below  No parkinsonism - slightly more rigidity but no bradykinesia or tremor  No behavior changes or hallucinations  PLAN     · Continue the Exelon patch dose as before   · For memantine, she can take 1/2 tablet (5mg) in the morning and 1 tab (10mg) in evening to see if further benefit can be gained, and this would be the maximum dose tolerated     · She will increase the amount of water she takes in a day  · Neuroimaging reviewed in system, MRI NeuroQuant from March and Dec 2018  No new imaging needed  · She will continue to refrain from driving  · Encouraged to remain both physically and mentally active, including crossword puzzles, brain teasers  Online resources at [employer] for cognitive training games for free  · The patient has been instructed to call us about any new neurological problems or medication side effects  · Return to Clinic in 4 months  A total of 40 minutes were spent face-to-face with this patient, of which 25% was spent on counseling and coordination of care  We discussed the natural history of the patient's condition, differential diagnosis, level of diagnostic certainty, treatment alternatives and their side effects and possible complications  HISTORY OF PRESENT ILLNESS:     Ms Mari Castellon is a 66 y o  right handed female with history of bilateral carotid bruit, bilateral hearing loss, hypertension, bilateral cataract, mitral and aortic heart valve disease, hyperlipidemia, osteoporosis, vertigo, vitamin-D deficiency, who [facility] [address] [place] [address] for transfer of care and evaluation of memory issues  Last visit 4/11/19  The patient was accompanied today  History was obtained from patient and spouse    Interim History  Lab results were normal except for borderline low vitamin D; supplementation advised  Her  feels that her memory has been fairly stable  She is still on Exelon patch 4 6mg and memantine 10mg at night only  On 10mg memantine in the morning, she had dizziness  She remains not driving  They have no other questions today  Sleeping is good, she has balanced meals with some dessert, physical activity mostly consisting of housework  She does do Sudoku and crossword puzzles  INITIAL HISTORY  Main bothersome neurological symptoms today are:   1   Poor memory  Pt followed previously by Dr Rubén Kim since 2018, last visit 1/11/19  Per chart review and confirmed by patient below:   She originally presented to [address] [place] [address] as 2nd opinion for evaluation of her cognitive function  Patient was previousy evaluated by [facility] team Dr Bailey Oleary 12/07/17  She endorsed short-term memory loss, forgetting some household functions, difficulties with directions, difficulties driving, starting since early-mid 2017 with some progression  Patient described feeling depressed on and off with no suicidal ideation has been noted  Patient requires no assistance with ADLs  She has no delusion or hallucination, no signs of Parkinson's disease or abnormal ambulatory function  Patient had completed brain MRI in [facility] in November 2017 which suggests no stroke no focal brain volume loss  Patient has been working with audiologist for hearing loss, and recommended hearing aids  Pt herself has refused  Patient had MMSE completed in Jan 2018 and she scored 27/30, and her mini-mental status done at [facility] was 28/30  Patient was able to recall 1/3 words  MRI brain Neuroquant showed hippocampal volume loss at 11%, which is low normal, but significant enough to consider Alzheimer's dementia in case of progression of cognitive dysfunction  Patient was asked to start Exelon ER 4 6 mg patch, and she is to start cognitive therapy  She had Neuropsych evaluation with Dr Lucila Gallagher on 5/2018 confirmed profound amnestic memory profile meeting the definition of dementia but without clear neurodegenerative processes  Recommendation of repeating MRI and Neuropsychological evaluation  FTD evaluation showed pt scoring an 90% likelihood on failing on road  Repeat MRI NeuroQuant showed progressive hyppocampal volume loss in 9 month interval   Pt thus advised to start slow titration of memantine, and then continue memantine 10 milligrams twice daily as maintenance  MMSE as of Jan 2019 was 26/30 with 0/3 recall      She had since increased the memantine to 15mg qDay but felt dizzy and unable to tolerate and then discontinued it abruptly   says she often forgets to take her pills and skipping breakfast, mainly because she does not feel very hungry  She has appetite more for ice cream and a modest weight loss in the last year  She has snacks moreso than regular meals  She drinks at most 2 glasses of milk a day, and total fluid intake is less than 32 oz  Physically she is more active with her chores around the home - laundry, cleaning  Current Relevant Medications:   - Exelon patch 4 6mg/qDay    Living Situation + ADLs: lives at home with , with various members of family living in close proximity  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 with it, leaving daily operations to her children  HS graduate  She cooks, and she is driving without a 's license  She was more active in doing the finances but now  does it more due to her impaired concentration       REVIEW OF PAST MEDICAL, SOCIAL AND FAMILY HISTORY:  This is the list of problems as per our Medical Records:    Patient Active Problem List    Diagnosis Date Noted    Alzheimer's dementia without behavioral disturbance 07/23/2018    Dizzy spells 07/17/2018    KIRSTIE (acute kidney injury) ([facility] [address] ) 05/31/2018    Chest pain, atypical 05/31/2018    Thigh shingles 05/31/2018    Dementia 03/12/2018    Bilateral hearing loss 11/24/2017    Post-traumatic osteoarthritis of right elbow 01/11/2017    Gallbladder calculus without cholecystitis 07/27/2016    Bilateral carotid bruits 06/29/2016    Change in bowel habits 06/29/2016    Mitral and aortic valve disease 06/29/2016    Cataract 06/01/2016    Essential hypertension 06/01/2016    Mixed dyslipidemia 06/01/2016    Risk for falls 06/01/2016    Vitamin D deficiency disease 06/01/2016    Osteoporosis 09/29/2014     Allergies   Allergen Reactions    Acetaminophen-Codeine      Chest pain    Ciprofloxacin Hives Action Taken: unknown; Outpatient Encounter Medications as of 8/14/2019   Medication Sig Dispense Refill    alendronate (FOSAMAX) 70 mg tablet       Cholecalciferol 2000 units CAPS Take 2 capsules by mouth daily       cyanocobalamin ([employer] VITAMIN B-12) 1000 MCG tablet Take 1,000 mcg by mouth      Ginkgo Biloba 400 MG CAPS Bid or tid      lisinopril (ZESTRIL) 2 5 mg tablet Take 1 tablet by mouth daily      memantine (NAMENDA) 10 mg tablet Take 1 tablet (10 mg total) by mouth daily 30 tablet 3    Multiple Vitamins-Iron (DAILY LEROY MULTIVITAMIN/IRON) TABS Take 1 tablet by mouth      pravastatin (PRAVACHOL) 40 mg tablet Take 40 mg by mouth daily      rivastigmine (EXELON) 4 6 mg/24 hr TD 24 hr patch Place 1 patch on the skin daily 30 patch 3    alendronate (FOSAMAX) 70 mg tablet Take 70 mg by mouth Once a week      [DISCONTINUED] aspirin 81 MG tablet Take 81 mg by mouth      [DISCONTINUED] DOCOSAHEXAENOIC ACID PO Take 1,000 mg by mouth      [DISCONTINUED] folic acid (FOLVITE) 1 mg tablet Take 1 tablet by mouth      [DISCONTINUED] triamcinolone (KENALOG) 0 1 % cream DARIUS AA TID PRN  0     No facility-administered encounter medications on file as of 8/14/2019  REVIEW OF SYSTEMS:  The patient has entered data on an intake form regarding present illness, past medical and surgical history, medications, allergies, family and social history, and a full review of 14 systems  I have reviewed this form with the patient, and all the relevant information has been included on this note  The full review of systems was negative except as stated in HPI and below  Constitutional: Negative  Negative for appetite change and fever  HENT: Negative  Negative for hearing loss, tinnitus, trouble swallowing and voice change  Eyes: Negative  Negative for photophobia and pain  Respiratory: Negative  Negative for shortness of breath  Cardiovascular: Negative  Negative for palpitations     Gastrointestinal: Negative  Negative for nausea and vomiting  Endocrine: Negative  Negative for cold intolerance and heat intolerance  Genitourinary: Negative  Negative for dysuria, frequency and urgency  Musculoskeletal: Negative  Negative for myalgias and neck pain  Skin: Negative  Negative for rash  Neurological: Positive for dizziness  Negative for tremors, seizures, syncope, facial asymmetry, speech difficulty, weakness, light-headedness, numbness and headaches  Memory is not getting any better   Hematological: Negative  Does not bruise/bleed easily  Psychiatric/Behavioral: Positive for confusion  Negative for hallucinations and sleep disturbance  FOCUSED PHYSICAL EXAMINATION:     Vital signs:  /73 (BP Location: Left arm, Patient Position: Sitting, Cuff Size: Standard)   Pulse 76   Ht 5' 7" (1 702 m)   Wt 67 6 kg (149 lb)   BMI 23 34 kg/m²      General:  Well-appearing, well nourished, pleasant patient in no acute distress  Mood and Fund of Knowledge are appropriate  Head:  Normocephalic, atraumatic  Oropharynx and conjunctiva are clear  Speech  No hypophonia, no bradylalia  No scanning speech  Language: Comprehension intact  Neck:  Supple, strong 5/5 forward flexion and retroflexion  Extremities: Range of motion is normal       Cognitive and Mental Exam:   MOCA previously was 26/30 in Jan 2019    [address] [place] [address]    Points MAX   Visuospatial  ----------   Trails A 1 1   Cube Drawing 1 1   Clock Drawing 3 3   Naming Objects 3 3   Attention  ----------   Digit Span 1 2   Letter Reading 1 1   Serial 7s 1 3   Language  ----------   Repetition 2 2   Fluency 1 1   Abstraction 2 2   Delayed Recall 0 5   Orientation                 6             6              22 30   +1 for  education = 23    She has some hesitancy free listing out animals  Cranial Nerves:  CN II:  Direct and consensual light reflexes were equally reactive to light symmetrically    No afferent pupillary defect   Visual fields are full to confrontation  CN III / IV / VI: Extraocular movements were full, with normal pursuit and saccades  CN V:   Facial sensation to light touch was intact  CN VII: Face is symmetric with normal strength  CN VIII: Hearing was assessed using the Calibrated Finger Rub Auditory Screening Test (CALFRAST) and was not abnormal (Better than CALFRAST-Strong-70)  CN X:   Palate is up going bilaterally and symmetrically  CN XI:  Neck muscles are strong  CN XII: Tongue protrusion is at midline with normal movements  No dysarthria  Motor:    Dystonia: none  Dyskinesia: none  Myoclonus: none  Chorea: none  Tics: none      UPDRSIII                Time since last dose:    4/11/19 8/14/19   Speech  1   2   Facial Expression  2 2   Postural Tremor (Right) 0 0   Postural Tremor (Left) 0 0   Kinetic Tremor (Right)  0 0   Kinetic Tremor (Left)  0 0   Rest tremor amplitude RUE 0 0   Rest tremor amplitude LUE 0 0   Rest tremor amplitude RLE 0 0   Rest tremor amplitude LLE 0 0   Lip/Jaw Tremor  0 0   Consistency of tremor 0 0   Finger Taps (Right)   1 1   Finger Taps (Left)  1 1   Hand Movement (Right)  0 0   Hand Movement (Left)   0 0   Pronation/Supination (Right)  0 0   Pronation/Supination (Left)   0 0   Toe Tapping (Right) 1 -   Toe Tapping (Left) 1 -   Leg Agility (Right)  1 -   Leg Agility (Left)   1   -   Rigidity - Neck  2 (complicated by the contraction of the elbow, chronic)   2   Rigidity - Upper Extremity (Right)  2    2   Rigidity - Upper Extremity (Left)   0   2   Rigidity - Lower Extremity (Right)  0 2   Rigidity - Lower Extremity (Left)   0 2   Arising from Chair   0   0    Gait    0   2   Freezing of Gait 1   1   Postural Stability  -   -   Posture 0   0   Global spontaneity of movement 0   0   Hammertoes in the R foot, surgically operated on before but splayed out  -------------------------------------------------------------------------------------    Muscle Strength Right Left  Muscle Strength Right Left   Deltoid 5/5 5/5  Hip Adductors 5/5 5/5   Biceps 5/5 5/5  Hip Abductors 5/5 5/5   Triceps 5/5 5/5  Knee Extensors 5/5 5/5   Wrist Extensors 5/5 5/5  Knee Flexors 5/5 5/5   Wrist Flexors 5/5 5/5  Ankle Extensors 5/5 5/5    5/5 5/5  Ankle Flexors 5/5 5/5   Finger Abductors 5/5 5/5       Hip Flexors 5/5 5/5   Hip Extensors 5/5 5/5     Sensory  Intact to Light Touch, Temperature, and vibration sense in all extremities EXCEPT for the RUE with mildly decreased sensation     Coordination:  Finger-to-nose-finger: normal     Gait:  Normal comprehensive gait evaluation, has normal raising, stance, gait, turns  Feet are externally rotated on forward gait  No unsteadiness or tremors        Reflexes:    Right Left   Biceps 2/4 2/4   Brachioradialis 2/4 2/4   Triceps 1/4 1/4   Knee 1/4 1/4   Ankle 1/4 1/4

## 2019-08-14 NOTE — PATIENT INSTRUCTIONS
· Continue the Exelon patch dose as before   · For memantine, she can take 1/2 tablet (5mg) in the morning and 1 tab (10mg) in evening to see if further benefit can be gained, and this would be the maximum dose tolerated  · She will increase the amount of water she takes in a day  · Neuroimaging reviewed in system, MRI NeuroQuant from March and Dec 2018  No new imaging needed  · She will continue to refrain from driving  · Encouraged to remain both physically and mentally active, including crossword puzzles, brain teasers  Online resources at [employer] for cognitive training games for free  · The patient has been instructed to call us about any new neurological problems or medication side effects  · Return to Clinic in 4 months

## 2019-08-14 NOTE — PROGRESS NOTES
Review of Systems   Constitutional: Negative  Negative for appetite change and fever  HENT: Negative  Negative for hearing loss, tinnitus, trouble swallowing and voice change  Eyes: Negative  Negative for photophobia and pain  Respiratory: Negative  Negative for shortness of breath  Cardiovascular: Negative  Negative for palpitations  Gastrointestinal: Negative  Negative for nausea and vomiting  Endocrine: Negative  Negative for cold intolerance and heat intolerance  Genitourinary: Negative  Negative for dysuria, frequency and urgency  Musculoskeletal: Negative  Negative for myalgias and neck pain  Skin: Negative  Negative for rash  Neurological: Positive for dizziness  Negative for tremors, seizures, syncope, facial asymmetry, speech difficulty, weakness, light-headedness, numbness and headaches  Memory is not getting any better   Hematological: Negative  Does not bruise/bleed easily  Psychiatric/Behavioral: Positive for confusion  Negative for hallucinations and sleep disturbance

## 2019-09-12 ENCOUNTER — TELEPHONE (OUTPATIENT)
Dept: NEUROLOGY | Facility: CLINIC | Age: 79
End: 2019-09-12

## 2019-09-12 NOTE — LETTER
September 12, 2019     Randy Morriss  [address] [facility] [address]    Patient: Oscar Mar   YOB: 1940       To Whom It May Concern:    Joe Lopez is under my professional care  Due to her neurological condition, please excuse her from jury duty in [facility] on October 15th, 2019  If you have any further questions, please feel free to reach out to our office       Juror Number: [id number]      Sincerely,           Emerita Garcia MD

## 2019-12-13 ENCOUNTER — TELEPHONE (OUTPATIENT)
Dept: NEUROLOGY | Facility: CLINIC | Age: 79
End: 2019-12-13

## 2019-12-16 NOTE — PROGRESS NOTES
DEPARTMENT OF NEUROLOGICAL SCIENCES  [address] [facility]        RETURN PATIENT NOTE    Patient: Ana Rojas  Medical Record Number: # [id number]  YOB: 1940  Date of visit: 12/18/2019    Referring provider: No ref  provider found    ASSESSMENT     Diagnoses for this encounter:  1  Late onset Alzheimer's disease without behavioral disturbance (HCC)  memantine (NAMENDA) 10 mg tablet    rivastigmine (EXELON) 4 6 mg/24 hr TD 24 hr patch   2  Orthostatic dizziness        Impression of this 79 yo lady here for f/u of short-term memory decline with likely MCI / dementia with difficulty on today's memory testing  She is still taking Exelon patch and memantine  She has progressed, per her , in what she cannot recall actions she has taken unless reminded  She continues to not drive  She does have some mild orthostatism symptoms on standing but she admits not having followed previous recommendations for adequate fluid intake  Proceed as below  PLAN     · Continue the Exelon patch dose as before and the memantine 10mg daily  · Recommended drinking at least 24-36 oz of water or sugarless fluids a day  She will stand up slowly  · She will continue to refrain from driving  · Recommended to consider addition of Omega 3 Fish oil supplements, turmeric (curcumin) 500-750mg daily  · Recommended Mediterranean diet for stroke risk and for memory  · Encouraged to remain both physically and mentally active, including crossword puzzles, brain teasers  Online resources at [employer] for cognitive training games for free  · The patient has been instructed to call us about any new neurological problems or medication side effects  · Return to Clinic in 4 months  A total of 40 minutes were spent face-to-face with this patient, of which 25% was spent on counseling and coordination of care       HISTORY OF PRESENT ILLNESS:     Ms Tomer Mcgill is a 78 y o  right handed female with history of bilateral carotid bruit, bilateral hearing loss, hypertension, bilateral cataract, mitral and aortic heart valve disease, hyperlipidemia, osteoporosis, vertigo, vitamin-D deficiency, who [facility] [address] [place] [address] for Alzheimers' dementia  Last visit 8/14/19  The patient was accompanied today  History was obtained from patient and spouse    Interim History  Letter for jury duty excuse signed  No interim calls made to our office  She remains on the Exelon patch and the memantine  Her  feels her short term memory continues to be poor, asking and repeating more often in a shorter span of time  There was an incident about a missing check that she did not recall having personally delivered the check to someone until had a discussion  He says that she is more "adair" and "depressed-looking"  She herself denies any anxiety or depression  She does feel some lightheadedness when she stands up too fast, such as from a car  She admits she is not drinking water despite advised to  Her  says she needs constant reminders to eat and to drink  She currently plays games on her phone, completes crossword puzzles, as well as tv shows that challenge her  INITIAL HISTORY  Main bothersome neurological symptoms today are:   1  Poor memory  Pt followed previously by Dr Miller Forward since 2018, last visit 1/11/19  Per chart review and confirmed by patient below:   She originally presented to [address] [place] [address] Neuro as 2nd opinion for evaluation of her cognitive function  Patient was previousy evaluated by [facility] Neurology team Dr Llanos Finely 12/07/17  She endorsed short-term memory loss, forgetting some household functions, difficulties with directions, difficulties driving, starting since early-mid 2017 with some progression  Patient described feeling depressed on and off with no suicidal ideation has been noted  Patient requires no assistance with ADLs    She has no delusion or hallucination, no signs of Parkinson's disease or abnormal ambulatory function  Patient had completed brain MRI in [facility] in November 2017 which suggests no stroke no focal brain volume loss  Patient has been working with audiologist for hearing loss, and recommended hearing aids  Pt herself has refused  Patient had MMSE completed in Jan 2018 and she scored 27/30, and her mini-mental status done at [facility] was 28/30  Patient was able to recall 1/3 words  MRI brain Neuroquant showed hippocampal volume loss at 11%, which is low normal, but significant enough to consider Alzheimer's dementia in case of progression of cognitive dysfunction  Patient was asked to start Exelon ER 4 6 mg patch, and she is to start cognitive therapy  She had Neuropsych evaluation with Dr Baltazar Anderson on 5/2018 confirmed profound amnestic memory profile meeting the definition of dementia but without clear neurodegenerative processes  Recommendation of repeating MRI and Neuropsychological evaluation  FTD evaluation showed pt scoring an 90% likelihood on failing on road  Repeat MRI NeuroQuant showed progressive hyppocampal volume loss in 9 month interval   Pt thus advised to start slow titration of memantine, and then continue memantine 10 milligrams twice daily as maintenance  MMSE as of Jan 2019 was 26/30 with 0/3 recall  She had since increased the memantine to 15mg qDay but felt dizzy and unable to tolerate and then discontinued it abruptly   says she often forgets to take her pills and skipping breakfast, mainly because she does not feel very hungry  She has appetite more for ice cream and a modest weight loss in the last year  She has snacks moreso than regular meals  She drinks at most 2 glasses of milk a day, and total fluid intake is less than 32 oz  Physically she is more active with her chores around the home - laundry, cleaning       Current Relevant Medications:   - Exelon patch 4 6mg/qDay    Living Situation + ADLs: lives at home with , with various members of family living in close proximity  She had been running a catering business and she is not helping out with it, leaving daily operations to her children  HS graduate  She cooks, and she is driving without a 's license  She was more active in doing the finances but now  does it more due to her impaired concentration  REVIEW OF PAST MEDICAL, SOCIAL AND FAMILY HISTORY:  This is the list of problems as per our Medical Records:    Patient Active Problem List    Diagnosis Date Noted    Orthostatic dizziness 12/18/2019    Alzheimer's dementia without behavioral disturbance ([facility] [address] ) 07/23/2018    Dizzy spells 07/17/2018    KIRSTIE (acute kidney injury) ([facility] [address] ) 05/31/2018    Chest pain, atypical 05/31/2018    Thigh shingles 05/31/2018    Dementia ([facility] [address] ) 03/12/2018    Bilateral hearing loss 11/24/2017    Post-traumatic osteoarthritis of right elbow 01/11/2017    Gallbladder calculus without cholecystitis 07/27/2016    Bilateral carotid bruits 06/29/2016    Change in bowel habits 06/29/2016    Mitral and aortic valve disease 06/29/2016    Cataract 06/01/2016    Essential hypertension 06/01/2016    Mixed dyslipidemia 06/01/2016    Risk for falls 06/01/2016    Vitamin D deficiency disease 06/01/2016    Osteoporosis 09/29/2014     Allergies   Allergen Reactions    Acetaminophen-Codeine      Chest pain    Ciprofloxacin Hives     Action Taken: unknown;        Outpatient Encounter Medications as of 12/18/2019   Medication Sig Dispense Refill    alendronate (FOSAMAX) 70 mg tablet Take 70 mg by mouth Once a week      alendronate (FOSAMAX) 70 mg tablet       Cholecalciferol 2000 units CAPS Take 2 capsules by mouth daily       cyanocobalamin ([employer] VITAMIN B-12) 1000 MCG tablet Take 1,000 mcg by mouth      Ginkgo Biloba 400 MG CAPS Bid or tid      lisinopril (ZESTRIL) 2 5 mg tablet Take 1 tablet by mouth daily      memantine (NAMENDA) 10 mg tablet Take 1 tablet (10 mg total) by mouth daily 30 tablet 3    Multiple Vitamins-Iron (DAILY LEROY MULTIVITAMIN/IRON) TABS Take 1 tablet by mouth      pravastatin (PRAVACHOL) 40 mg tablet Take 40 mg by mouth daily      rivastigmine (EXELON) 4 6 mg/24 hr TD 24 hr patch Place 1 patch on the skin daily 30 patch 3    [DISCONTINUED] memantine (NAMENDA) 10 mg tablet Take 1 tablet (10 mg total) by mouth daily 30 tablet 3    [DISCONTINUED] rivastigmine (EXELON) 4 6 mg/24 hr TD 24 hr patch Place 1 patch on the skin daily 30 patch 3     No facility-administered encounter medications on file as of 12/18/2019  REVIEW OF SYSTEMS:  The patient has entered data on an intake form regarding present illness, past medical and surgical history, medications, allergies, family and social history, and a full review of 14 systems  I have reviewed this form with the patient, and all the relevant information has been included on this note  The full review of systems was negative except as stated in HPI and below  Review of Systems   Constitutional: Negative  Negative for appetite change and fever  HENT: Negative  Negative for hearing loss, tinnitus, trouble swallowing and voice change  Eyes: Negative  Negative for photophobia and pain  Respiratory: Negative  Negative for shortness of breath  Cardiovascular: Negative  Negative for palpitations  Gastrointestinal: Negative  Negative for nausea and vomiting  Endocrine: Negative  Negative for cold intolerance and heat intolerance  Genitourinary: Negative  Negative for dysuria, frequency and urgency  Musculoskeletal: Positive for neck pain  Negative for myalgias  Skin: Negative  Negative for rash  Neurological: Positive for dizziness and light-headedness (Comes and goes)  Negative for tremors, seizures, syncope, facial asymmetry, speech difficulty, weakness, numbness and headaches          Memory loss continues to get worse, very forgetful, short time   Repetitive questioning    Hematological: Negative  Does not bruise/bleed easily  Psychiatric/Behavioral: Positive for agitation (mood swings ), confusion (more confusion ) and sleep disturbance (increased sleepiness )  Negative for hallucinations  FOCUSED PHYSICAL EXAMINATION:     Vital signs:  /59 (BP Location: Left arm, Patient Position: Sitting, Cuff Size: Standard)   Pulse 83   Ht 5' 7" (1 702 m)   Wt 67 9 kg (149 lb 9 6 oz)   BMI 23 43 kg/m²     General:  Decreased affect  Well-appearing, well nourished, pleasant patient in no acute distress  Mood appropriate  Head:  Normocephalic, atraumatic  Oropharynx and conjunctiva are clear  Speech  +hypophonia, no bradylalia  No scanning speech  Language: Comprehension intact  Neck:  Supple, strong 5/5 forward flexion and retroflexion  Extremities: Range of motion is normal       Cognitive and Mental Exam:   MOCA previously was 26/30 in Jan 2019  [facility] was 23/30 with 0/5 recall in Aug 2019    She is awake, alert and oriented to place [employer]", [place], [place]), but with trouble on time (correct year, month but not day or date)  She could spell the word WORLD forwards and backwards correctly  She knows four quarters in a dollar, but hesitated for a while on # nickels in a dollar  She ultimately got correct # of nickels in $1 35  She listed 10 words in one minute that began with letter B  She has no anomia  She recalls 0 / 3 words ( strawberry, bravo, wind)  Cranial Nerves:  CN II:  Direct and consensual light reflexes were equally reactive to light symmetrically  No afferent pupillary defect   Visual fields are full to confrontation  CN III / IV / VI: Extraocular movements were full, with normal pursuit and saccades  CN V:   Facial sensation to light touch was intact  CN VII: Face is symmetric with normal strength     CN VIII: Hearing was not assessed  CN X:   Palate is up going bilaterally and symmetrically  CN XI:  Neck muscles are strong  CN XII: Tongue protrusion is at midline with normal movements  No dysarthria  Motor:    Dystonia: none  Dyskinesia: none  Myoclonus: none  Chorea: none  Tics: none  [id number]                Time since last dose:   4/11/19 8/14/19 12/18/19   Speech  1  2  2   Facial Expression  2 2 2   Postural Tremor (Right) 0 0 0   Postural Tremor (Left) 0 0 0   Kinetic Tremor (Right)  0 0 0   Kinetic Tremor (Left)  0 0 0   Rest tremor amplitude RUE 0 0 0   Rest tremor amplitude LUE 0 0 0   Rest tremor amplitude RLE 0 0 0   Rest tremor amplitude LLE 0 0 0   Lip/Jaw Tremor  0 0 0   Consistency of tremor 0 0 0   Finger Taps (Right)   1 1 -   Finger Taps (Left)  1 1 -   Hand Movement (Right)  0 0 -   Hand Movement (Left)   0 0 -   Pronation/Supination (Right)  0 0 -   Pronation/Supination (Left)   0 0 -   Toe Tapping (Right) 1 - -   Toe Tapping (Left) 1 - -   Leg Agility (Right)  1 - -   Leg Agility (Left)   1  - -   Rigidity - Neck  2 (complicated by the contraction of the elbow, chronic)  2 -   Rigidity - Upper Extremity (Right)  2   2 -   Rigidity - Upper Extremity (Left)   0  2 -   Rigidity - Lower Extremity (Right)  0 2 -   Rigidity - Lower Extremity (Left)   0 2 -   Arising from Chair   0  0 0    Gait   0  2 2   Freezing of Gait 1  1 1   Postural Stability  -  - -   Posture 0  0 1   Global spontaneity of movement 0  0 1   Hammertoes in the R foot, surgically operated on before but splayed out  -------------------------------------------------------------------------------------    Muscle Strength Right Left  Muscle Strength Right Left   5/5 strength in both proximal and distal muscle groups of the UE / LE     Gait:  Normal comprehensive gait evaluation, has normal raising, stance, gait, turns  Feet are externally rotated on forward gait  No unsteadiness or tremors        Reflexes:    Right Left   Biceps 2/4 2/4   Brachioradialis 2/4 2/4   Triceps 2/4 2/4   Knee 1/4 1/4   Ankle 1/4 1/4

## 2019-12-18 ENCOUNTER — OFFICE VISIT (OUTPATIENT)
Dept: NEUROLOGY | Facility: CLINIC | Age: 79
End: 2019-12-18
Payer: MEDICARE

## 2019-12-18 VITALS
BODY MASS INDEX: 23.48 KG/M2 | HEIGHT: 67 IN | WEIGHT: 149.6 LBS | DIASTOLIC BLOOD PRESSURE: 59 MMHG | HEART RATE: 83 BPM | SYSTOLIC BLOOD PRESSURE: 123 MMHG

## 2019-12-18 DIAGNOSIS — G30.1 LATE ONSET ALZHEIMER'S DISEASE WITHOUT BEHAVIORAL DISTURBANCE (HCC): Primary | ICD-10-CM

## 2019-12-18 DIAGNOSIS — R42 ORTHOSTATIC DIZZINESS: ICD-10-CM

## 2019-12-18 DIAGNOSIS — F02.80 LATE ONSET ALZHEIMER'S DISEASE WITHOUT BEHAVIORAL DISTURBANCE (HCC): Primary | ICD-10-CM

## 2019-12-18 PROCEDURE — 99214 OFFICE O/P EST MOD 30 MIN: CPT | Performed by: PSYCHIATRY & NEUROLOGY

## 2019-12-18 RX ORDER — MEMANTINE HYDROCHLORIDE 10 MG/1
10 TABLET ORAL DAILY
Qty: 30 TABLET | Refills: 3 | Status: SHIPPED | OUTPATIENT
Start: 2019-12-18 | End: 2020-04-30 | Stop reason: SDUPTHER

## 2019-12-18 RX ORDER — RIVASTIGMINE 4.6 MG/24H
1 PATCH, EXTENDED RELEASE TRANSDERMAL DAILY
Qty: 30 PATCH | Refills: 3 | Status: SHIPPED | OUTPATIENT
Start: 2019-12-18 | End: 2020-04-29 | Stop reason: SDUPTHER

## 2019-12-18 NOTE — LETTER
December 18, 2019     Nikole Holt DO   [address]  [address]    Patient: Naveen Yeh   YOB: 1940   Date of Visit: 12/18/2019       Dear Dr Antonio Arita:    Earlier today I saw Mrs Loraine Mccauley for follow-up of her dementia  Below are my notes for this visit for your records and to keep you updated on her health status  If you have questions, please do not hesitate to call me  I look forward to following your patient along with you  Sincerely,        Charles Dominguez MD        CC: No Recipients  Charles Dominguez MD  12/18/2019 12:31 PM  Sign at close encounter  [facility] PATIENT NOTE    Patient: Lyn Guo7 Record Number: # [id number]  YOB: 1940  Date of visit: 12/18/2019    Referring provider: No ref  provider found    ASSESSMENT     Diagnoses for this encounter:  1  Late onset Alzheimer's disease without behavioral disturbance (HCC)  memantine (NAMENDA) 10 mg tablet    rivastigmine (EXELON) 4 6 mg/24 hr TD 24 hr patch   2  Orthostatic dizziness        Impression of this 77 yo lady here for f/u of short-term memory decline with likely MCI / dementia with difficulty on today's memory testing  She is still taking Exelon patch and memantine  She has progressed, per her , in what she cannot recall actions she has taken unless reminded  She continues to not drive  She does have some mild orthostatism symptoms on standing but she admits not having followed previous recommendations for adequate fluid intake  Proceed as below  PLAN     · Continue the Exelon patch dose as before and the memantine 10mg daily  · Recommended drinking at least 24-36 oz of water or sugarless fluids a day  She will stand up slowly  · She will continue to refrain from driving     · Recommended to consider addition of Omega 3 Fish oil supplements, turmeric (curcumin) 500-750mg daily  · Recommended Mediterranean diet for stroke risk and for memory  · Encouraged to remain both physically and mentally active, including crossword puzzles, brain teasers  Online resources at [employer] for cognitive training games for free  · The patient has been instructed to call us about any new neurological problems or medication side effects  · Return to Clinic in 4 months  A total of 40 minutes were spent face-to-face with this patient, of which 25% was spent on counseling and coordination of care  HISTORY OF PRESENT ILLNESS:     Ms Carlee Jerez is a 78 y o  right handed female with history of bilateral carotid bruit, bilateral hearing loss, hypertension, bilateral cataract, mitral and aortic heart valve disease, hyperlipidemia, osteoporosis, vertigo, vitamin-D deficiency, who [facility] [address] [place] [address] for Alzheimers' dementia  Last visit 8/14/19  The patient was accompanied today  History was obtained from patient and spouse    Interim History  Letter for jury duty excuse signed  No interim calls made to our office  She remains on the Exelon patch and the memantine  Her  feels her short term memory continues to be poor, asking and repeating more often in a shorter span of time  There was an incident about a missing check that she did not recall having personally delivered the check to someone until had a discussion  He says that she is more "adair" and "depressed-looking"  She herself denies any anxiety or depression  She does feel some lightheadedness when she stands up too fast, such as from a car  She admits she is not drinking water despite advised to  Her  says she needs constant reminders to eat and to drink  She currently plays games on her phone, completes crossword puzzles, as well as tv shows that challenge her  INITIAL HISTORY  Main bothersome neurological symptoms today are:   1   Poor memory  Pt followed previously by Dr Ora Hutchins since 2018, last visit 1/11/19  Per chart review and confirmed by patient below:   She originally presented to [facility]) Neuro as 2nd opinion for evaluation of her cognitive function  Patient was previousy evaluated by [facility] Neurology team Dr Irina Appiah 12/07/17  She endorsed short-term memory loss, forgetting some household functions, difficulties with directions, difficulties driving, starting since early-mid 2017 with some progression  Patient described feeling depressed on and off with no suicidal ideation has been noted  Patient requires no assistance with ADLs  She has no delusion or hallucination, no signs of Parkinson's disease or abnormal ambulatory function  Patient had completed brain MRI in [facility] in November 2017 which suggests no stroke no focal brain volume loss  Patient has been working with audiologist for hearing loss, and recommended hearing aids  Pt herself has refused  Patient had MMSE completed in Jan 2018 and she scored 27/30, and her mini-mental status done at [facility] was 28/30  Patient was able to recall 1/3 words  MRI brain Neuroquant showed hippocampal volume loss at 11%, which is low normal, but significant enough to consider Alzheimer's dementia in case of progression of cognitive dysfunction  Patient was asked to start Exelon ER 4 6 mg patch, and she is to start cognitive therapy  She had Neuropsych evaluation with Dr Mateusz Cullen on 5/2018 confirmed profound amnestic memory profile meeting the definition of dementia but without clear neurodegenerative processes  Recommendation of repeating MRI and Neuropsychological evaluation  FTD evaluation showed pt scoring an 90% likelihood on failing on road  Repeat MRI NeuroQuant showed progressive hyppocampal volume loss in 9 month interval   Pt thus advised to start slow titration of memantine, and then continue memantine 10 milligrams twice daily as maintenance   MMSE as of Jan 2019 was 26/30 with 0/3 recall  She had since increased the memantine to 15mg qDay but felt dizzy and unable to tolerate and then discontinued it abruptly   says she often forgets to take her pills and skipping breakfast, mainly because she does not feel very hungry  She has appetite more for ice cream and a modest weight loss in the last year  She has snacks moreso than regular meals  She drinks at most 2 glasses of milk a day, and total fluid intake is less than 32 oz  Physically she is more active with her chores around the home - laundry, cleaning  Current Relevant Medications:   - Exelon patch 4 6mg/qDay    Living Situation + ADLs: lives at home with , with various members of family living in close proximity  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 with it, leaving daily operations to her children  HS graduate  She cooks, and she is driving without a 's license  She was more active in doing the finances but now  does it more due to her impaired concentration       REVIEW OF PAST MEDICAL, SOCIAL AND FAMILY HISTORY:  This is the list of problems as per our Medical Records:    Patient Active Problem List    Diagnosis Date Noted    Orthostatic dizziness 12/18/2019    Alzheimer's dementia without behavioral disturbance ([facility] [facility] ) 07/23/2018    Dizzy spells 07/17/2018    KIRSTIE (acute kidney injury) ([facility] ) 05/31/2018    Chest pain, atypical 05/31/2018    Thigh shingles 05/31/2018    Dementia ([facility] [facility] ) 03/12/2018    Bilateral hearing loss 11/24/2017    Post-traumatic osteoarthritis of right elbow 01/11/2017    Gallbladder calculus without cholecystitis 07/27/2016    Bilateral carotid bruits 06/29/2016    Change in bowel habits 06/29/2016    Mitral and aortic valve disease 06/29/2016    Cataract 06/01/2016    Essential hypertension 06/01/2016    Mixed dyslipidemia 06/01/2016    Risk for falls 06/01/2016    Vitamin D deficiency disease 06/01/2016    Osteoporosis 09/29/2014     Allergies   Allergen Reactions    Acetaminophen-Codeine      Chest pain    Ciprofloxacin Hives     Action Taken: unknown; Outpatient Encounter Medications as of 12/18/2019   Medication Sig Dispense Refill    alendronate (FOSAMAX) 70 mg tablet Take 70 mg by mouth Once a week      alendronate (FOSAMAX) 70 mg tablet       Cholecalciferol 2000 units CAPS Take 2 capsules by mouth daily       cyanocobalamin (CVS VITAMIN B-12) 1000 MCG tablet Take 1,000 mcg by mouth      Ginkgo Biloba 400 MG CAPS Bid or tid      lisinopril (ZESTRIL) 2 5 mg tablet Take 1 tablet by mouth daily      memantine (NAMENDA) 10 mg tablet Take 1 tablet (10 mg total) by mouth daily 30 tablet 3    Multiple Vitamins-Iron (DAILY LEROY MULTIVITAMIN/IRON) TABS Take 1 tablet by mouth      pravastatin (PRAVACHOL) 40 mg tablet Take 40 mg by mouth daily      rivastigmine (EXELON) 4 6 mg/24 hr TD 24 hr patch Place 1 patch on the skin daily 30 patch 3    [DISCONTINUED] memantine (NAMENDA) 10 mg tablet Take 1 tablet (10 mg total) by mouth daily 30 tablet 3    [DISCONTINUED] rivastigmine (EXELON) 4 6 mg/24 hr TD 24 hr patch Place 1 patch on the skin daily 30 patch 3     No facility-administered encounter medications on file as of 12/18/2019  REVIEW OF SYSTEMS:  The patient has entered data on an intake form regarding present illness, past medical and surgical history, medications, allergies, family and social history, and a full review of 14 systems  I have reviewed this form with the patient, and all the relevant information has been included on this note  The full review of systems was negative except as stated in HPI and below  Review of Systems   Constitutional: Negative  Negative for appetite change and fever  HENT: Negative  Negative for hearing loss, tinnitus, trouble swallowing and voice change  Eyes: Negative  Negative for photophobia and pain  Respiratory: Negative    Negative for shortness of breath  Cardiovascular: Negative  Negative for palpitations  Gastrointestinal: Negative  Negative for nausea and vomiting  Endocrine: Negative  Negative for cold intolerance and heat intolerance  Genitourinary: Negative  Negative for dysuria, frequency and urgency  Musculoskeletal: Positive for neck pain  Negative for myalgias  Skin: Negative  Negative for rash  Neurological: Positive for dizziness and light-headedness (Comes and goes)  Negative for tremors, seizures, syncope, facial asymmetry, speech difficulty, weakness, numbness and headaches  Memory loss continues to get worse, very forgetful, short time   Repetitive questioning    Hematological: Negative  Does not bruise/bleed easily  Psychiatric/Behavioral: Positive for agitation (mood swings ), confusion (more confusion ) and sleep disturbance (increased sleepiness )  Negative for hallucinations  FOCUSED PHYSICAL EXAMINATION:     Vital signs:  /59 (BP Location: Left arm, Patient Position: Sitting, Cuff Size: Standard)   Pulse 83   Ht 5' 7" (1 702 m)   Wt 67 9 kg (149 lb 9 6 oz)   BMI 23 43 kg/m²      General:  Decreased affect  Well-appearing, well nourished, pleasant patient in no acute distress  Mood appropriate  Head:  Normocephalic, atraumatic  Oropharynx and conjunctiva are clear  Speech  +hypophonia, no bradylalia  No scanning speech  Language: Comprehension intact  Neck:  Supple, strong 5/5 forward flexion and retroflexion  Extremities: Range of motion is normal       Cognitive and Mental Exam:   MOCA previously was 26/30 in Jan 2019  [facility] was 23/30 with 0/5 recall in Aug 2019    She is awake, alert and oriented to place [employer]", [place], [place] but with trouble on time (correct year, month but not day or date)  She could spell the word WORLD forwards and backwards correctly  She knows four quarters in a dollar, but hesitated for a while on # nickels in a dollar   She ultimately got correct # of nickels in $1 35  She listed 10 words in one minute that began with letter B  She has no anomia  She recalls 0 / 3 words ( strawberry, bravo, wind)  Cranial Nerves:  CN II:  Direct and consensual light reflexes were equally reactive to light symmetrically  No afferent pupillary defect   Visual fields are full to confrontation  CN III / IV / VI: Extraocular movements were full, with normal pursuit and saccades  CN V:   Facial sensation to light touch was intact  CN VII: Face is symmetric with normal strength  CN VIII: Hearing was not assessed  CN X:   Palate is up going bilaterally and symmetrically  CN XI:  Neck muscles are strong  CN XII: Tongue protrusion is at midline with normal movements  No dysarthria  Motor:    Dystonia: none  Dyskinesia: none  Myoclonus: none  Chorea: none  Tics: none      UPDRSIII                Time since last dose:   4/11/19 8/14/19 12/18/19   Speech  1  2  2   Facial Expression  2 2 2   Postural Tremor (Right) 0 0 0   Postural Tremor (Left) 0 0 0   Kinetic Tremor (Right)  0 0 0   Kinetic Tremor (Left)  0 0 0   Rest tremor amplitude RUE 0 0 0   Rest tremor amplitude LUE 0 0 0   Rest tremor amplitude RLE 0 0 0   Rest tremor amplitude LLE 0 0 0   Lip/Jaw Tremor  0 0 0   Consistency of tremor 0 0 0   Finger Taps (Right)   1 1 -   Finger Taps (Left)  1 1 -   Hand Movement (Right)  0 0 -   Hand Movement (Left)   0 0 -   Pronation/Supination (Right)  0 0 -   Pronation/Supination (Left)   0 0 -   Toe Tapping (Right) 1 - -   Toe Tapping (Left) 1 - -   Leg Agility (Right)  1 - -   Leg Agility (Left)   1  - -   Rigidity - Neck  2 (complicated by the contraction of the elbow, chronic)  2 -   Rigidity - Upper Extremity (Right)  2   2 -   Rigidity - Upper Extremity (Left)   0  2 -   Rigidity - Lower Extremity (Right)  0 2 -   Rigidity - Lower Extremity (Left)   0 2 -   Arising from Chair   0  0 0    Gait   0  2 2   Freezing of Gait 1  1 1   Postural Stability  -  - -   Posture 0  0 1   Global spontaneity of movement 0  0 1   Hammertoes in the R foot, surgically operated on before but splayed out  -------------------------------------------------------------------------------------    Muscle Strength Right Left  Muscle Strength Right Left   5/5 strength in both proximal and distal muscle groups of the UE / LE     Gait:  Normal comprehensive gait evaluation, has normal raising, stance, gait, turns  Feet are externally rotated on forward gait  No unsteadiness or tremors        Reflexes:    Right Left   Biceps 2/4 2/4   Brachioradialis 2/4 2/4   Triceps 2/4 2/4   Knee 1/4 1/4   Ankle 1/4 1/4

## 2019-12-18 NOTE — PROGRESS NOTES
Review of Systems   Constitutional: Negative  Negative for appetite change and fever  HENT: Negative  Negative for hearing loss, tinnitus, trouble swallowing and voice change  Eyes: Negative  Negative for photophobia and pain  Respiratory: Negative  Negative for shortness of breath  Cardiovascular: Negative  Negative for palpitations  Gastrointestinal: Negative  Negative for nausea and vomiting  Endocrine: Negative  Negative for cold intolerance and heat intolerance  Genitourinary: Negative  Negative for dysuria, frequency and urgency  Musculoskeletal: Positive for neck pain  Negative for myalgias  Skin: Negative  Negative for rash  Neurological: Positive for dizziness and light-headedness (Comes and goes)  Negative for tremors, seizures, syncope, facial asymmetry, speech difficulty, weakness, numbness and headaches  Memory loss continues to get worse, very forgetful, short time   Repetitive questioning    Hematological: Negative  Does not bruise/bleed easily  Psychiatric/Behavioral: Positive for agitation (mood swings ), confusion (more confusion ) and sleep disturbance (increased sleepiness )  Negative for hallucinations

## 2019-12-18 NOTE — PATIENT INSTRUCTIONS
· Continue the Exelon patch dose as before and the memantine 10mg daily  · Recommended drinking at least 24-36 oz of water or sugarless fluids a day  · She will continue to refrain from driving  · Recommended to consider addition of Omega 3 Fish oil supplements, turmeric (curcumin) 500-750mg daily  · Recommended Mediterranean diet for stroke risk and for memory  · Encouraged to remain both physically and mentally active, including crossword puzzles, brain teasers  Online resources at [employer] for cognitive training games for free  · The patient has been instructed to call us about any new neurological problems or medication side effects  · Return to Clinic in 4 months

## 2020-04-22 ENCOUNTER — TELEPHONE (OUTPATIENT)
Dept: NEUROLOGY | Facility: CLINIC | Age: 80
End: 2020-04-22

## 2020-04-28 ENCOUNTER — TELEPHONE (OUTPATIENT)
Dept: NEUROLOGY | Facility: CLINIC | Age: 80
End: 2020-04-28

## 2020-04-29 ENCOUNTER — TELEMEDICINE (OUTPATIENT)
Dept: NEUROLOGY | Facility: CLINIC | Age: 80
End: 2020-04-29
Payer: MEDICARE

## 2020-04-29 DIAGNOSIS — G30.1 LATE ONSET ALZHEIMER'S DISEASE WITHOUT BEHAVIORAL DISTURBANCE (HCC): Primary | ICD-10-CM

## 2020-04-29 DIAGNOSIS — F02.80 LATE ONSET ALZHEIMER'S DISEASE WITHOUT BEHAVIORAL DISTURBANCE (HCC): Primary | ICD-10-CM

## 2020-04-29 PROCEDURE — 99442 PR PHYS/QHP TELEPHONE EVALUATION 11-20 MIN: CPT | Performed by: PSYCHIATRY & NEUROLOGY

## 2020-04-29 RX ORDER — RIVASTIGMINE 4.6 MG/24H
1 PATCH, EXTENDED RELEASE TRANSDERMAL DAILY
Qty: 90 EACH | Refills: 3 | Status: SHIPPED | OUTPATIENT
Start: 2020-04-29 | End: 2020-08-19 | Stop reason: SDUPTHER

## 2020-04-30 DIAGNOSIS — F02.80 LATE ONSET ALZHEIMER'S DISEASE WITHOUT BEHAVIORAL DISTURBANCE (HCC): ICD-10-CM

## 2020-04-30 DIAGNOSIS — G30.1 LATE ONSET ALZHEIMER'S DISEASE WITHOUT BEHAVIORAL DISTURBANCE (HCC): ICD-10-CM

## 2020-04-30 RX ORDER — MEMANTINE HYDROCHLORIDE 10 MG/1
10 TABLET ORAL DAILY
Qty: 90 TABLET | Refills: 1 | Status: SHIPPED | OUTPATIENT
Start: 2020-04-30 | End: 2020-08-19 | Stop reason: SDUPTHER

## 2020-07-14 ENCOUNTER — TELEPHONE (OUTPATIENT)
Dept: NEUROLOGY | Facility: CLINIC | Age: 80
End: 2020-07-14

## 2020-07-14 NOTE — TELEPHONE ENCOUNTER
Left message for patient to contact office regarding offering an appointment prior to Dr Sherrie Ch Leaving office- if not interested please reschedule with Aurelio Ruvalcaba

## 2020-08-17 NOTE — PROGRESS NOTES
[facility]        RETURN PATIENT NOTE    Patient: Tim Manning  Medical Record Number: # [id number]  YOB: 1940  Date of visit: 8/19/2020    ASSESSMENT      Problem List Items Addressed This Visit        Nervous and Auditory    Alzheimer's dementia without behavioral disturbance ([facility] [address] ) - Primary    Relevant Medications    memantine (NAMENDA) 10 mg tablet    rivastigmine (EXELON) 4 6 mg/24 hr TD 24 hr patch        Impression of this 79 yo lady returning for likely Alzheimer's dementia, still overall slightly worse per  while on Exelon patch / memantine  Her appetite is poorer and requires more motivation to eat, which is a caregiving challenge  However, there are no new home safety concerns and he feels capable of still caring for her full-time  PLAN     · Refilled the Exelon patch at the same dose, refilled memantine  · Encouraged to remain both physically and mentally active, including crossword puzzles, brain teasers  · Encouraged supplemental Boost or ensure shakes, given her poor diet and weight loss  Consider Nutritionist consult if further declines  · RTC in 6 months    I spent 15 minutes directly with the patient during this visit    Reason for visit is Alzheimer's dementia  Provider located at [address] [address]    Recent Visits  No visits were found meeting these conditions  Showing recent visits within past 7 days and meeting all other requirements     Today's Visits  Date Type Provider Dept   08/19/20 Office Visit Alessandro Tom MD [facility]   Showing today's visits and meeting all other requirements     Future Appointments  No visits were found meeting these conditions     Showing future appointments within next 150 days and meeting all other requirements Kwadwo Cage is a 78 y o  R-handed female with history of bilateral carotid bruit, bilateral hearing loss, hypertension, bilateral cataract, mitral and aortic heart valve disease, hyperlipidemia, osteoporosis, vertigo, vitamin-D deficiency, who returns for Alzheimers' dementia  Last encounter 4/29/29 over telephone but last in-person visit 12/18/19  The patient was accompanied today  History was obtained from patient and spouse    Interim History  No interim calls made to the office  She says she is not bothered by anything, but admits she cannot recall any if she did  Her , here today, says that her memory is in fact very frustrating to her  She has worsened in the interim, repeating questions every 15 minutes (e g  what's for dinner?)  She still does various household tasks without incident or endangering herself  Her medications are managed by , including the Exelon patch  She requires reminders to do so  She still can do her ADLs well  She is often confused about the date and time  No confusion about location, no hallucinations  She is on Exelon and memantine both  She stays home while  still goes to work  She tries to do some outdoor housework and 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  She feels she sleeps well, but her appetite is poor        Past Medical History:   Diagnosis Date    Hyperlipidemia     Hypertension     Memory loss      Past Surgical History:   Procedure Laterality Date    ELBOW SURGERY      REPLACEMENT TOTAL KNEE       Current Outpatient Medications   Medication Sig Dispense Refill    alendronate (FOSAMAX) 70 mg tablet       Cholecalciferol 2000 units CAPS Take 2 capsules by mouth daily       cyanocobalamin (CVS VITAMIN B-12) 1000 MCG tablet Take 1,000 mcg by mouth      Ginkgo Biloba 400 MG CAPS Bid or tid      lisinopril (ZESTRIL) 2 5 mg tablet Take 5 mg by mouth daily       memantine (NAMENDA) 10 mg tablet Take 1 tablet (10 mg total) by mouth daily 90 tablet 3    Multiple Vitamins-Iron (DAILY LEROY MULTIVITAMIN/IRON) TABS Take 1 tablet by mouth      pravastatin (PRAVACHOL) 40 mg tablet Take 80 mg by mouth daily       rivastigmine (EXELON) 4 6 mg/24 hr TD 24 hr patch Place 1 patch on the skin daily 90 patch 3    alendronate (FOSAMAX) 70 mg tablet Take 70 mg by mouth Once a week       No current facility-administered medications for this visit  Allergies   Allergen Reactions    Acetaminophen-Codeine      Chest pain    Ciprofloxacin Hives     Action Taken: unknown;      Review of Systems   Unable to perform ROS: Dementia     FOCUSED PHYSICAL EXAMINATION:      Vitals:    08/19/20 1410   BP: (!) 172/78   Pulse: 77   Temp: (!) 97 4 °F (36 3 °C)      General:  Decreased affect  Well-appearing, well nourished, pleasant patient in no acute distress  Mood appropriate  Head:  Normocephalic, atraumatic  Oropharynx and conjunctiva are clear  Speech  +hypophonia, no bradylalia  No scanning speech  Language: Comprehension intact  Neck:  Supple, strong 5/5 forward flexion and retroflexion  Extremities: Range of motion is normal       Cognitive and Mental Exam:   MOCA previously was 26/30 in Jan 2019  [facility] was 23/30 with 0/5 recall in Aug 2019    MOCA version 2 0    Points MAX   Visuospatial  ----------   Trails A 1 1   Cube Drawing 0 1   Clock Drawing 3 3   Naming Objects 2 3   Attention  ----------   Digit Span 1 2   Letter Reading 1 1   Serial 7s 2 3   Language  ----------   Repetition 2 2   Fluency 1 1   Abstraction 0 2   Delayed Recall 0 5   Orientation               2           6              15 30   + 1 for  education = 16     Cranial Nerves:  CN II:   Direct and consensual light reflexes were equally reactive to light symmetrically  No afferent pupillary defect              Visual fields are full to confrontation  CN III / IV / VI: Extraocular movements were full, with normal pursuit and saccades      CN V:    Facial sensation to light touch was intact  CN VII: Face is symmetric with normal strength    CN VIII: Hearing was not assessed  CN X:   Palate is up going bilaterally and symmetrically  CN XI:  Neck muscles are strong  CN XII: Tongue protrusion is at midline with normal movements  No dysarthria       Motor:     Dystonia: none  Dyskinesia: none  Myoclonus: none  Chorea: none  Tics: none      Hammertoes in the R foot, surgically operated on before but splayed out  -------------------------------------------------------------------------------------     Muscle Strength Right Left   Muscle Strength Right Left   5/5 strength in both proximal and distal muscle groups of the UE / LE     Gait:  Normal comprehensive gait evaluation, has normal raising, stance, gait, turns  Feet are externally rotated on forward gait   No unsteadiness or tremors       Reflexes:    Right Left   Biceps 2/4 2/4   Brachioradialis 2/4 2/4   Triceps 2/4 2/4   Knee 1/4 1/4   Ankle 1/4 1/4

## 2020-08-19 ENCOUNTER — OFFICE VISIT (OUTPATIENT)
Dept: NEUROLOGY | Facility: CLINIC | Age: 80
End: 2020-08-19
Payer: MEDICARE

## 2020-08-19 VITALS
TEMPERATURE: 97.4 F | SYSTOLIC BLOOD PRESSURE: 172 MMHG | WEIGHT: 136 LBS | DIASTOLIC BLOOD PRESSURE: 78 MMHG | BODY MASS INDEX: 21.35 KG/M2 | HEART RATE: 77 BPM | HEIGHT: 67 IN

## 2020-08-19 DIAGNOSIS — F02.80 LATE ONSET ALZHEIMER'S DISEASE WITHOUT BEHAVIORAL DISTURBANCE (HCC): Primary | ICD-10-CM

## 2020-08-19 DIAGNOSIS — G30.1 LATE ONSET ALZHEIMER'S DISEASE WITHOUT BEHAVIORAL DISTURBANCE (HCC): Primary | ICD-10-CM

## 2020-08-19 PROCEDURE — 99213 OFFICE O/P EST LOW 20 MIN: CPT | Performed by: PSYCHIATRY & NEUROLOGY

## 2020-08-19 RX ORDER — RIVASTIGMINE 4.6 MG/24H
1 PATCH, EXTENDED RELEASE TRANSDERMAL DAILY
Qty: 90 PATCH | Refills: 3 | Status: SHIPPED | OUTPATIENT
Start: 2020-08-19

## 2020-08-19 RX ORDER — MEMANTINE HYDROCHLORIDE 10 MG/1
10 TABLET ORAL DAILY
Qty: 90 TABLET | Refills: 3 | Status: SHIPPED | OUTPATIENT
Start: 2020-08-19

## 2020-08-19 NOTE — PATIENT INSTRUCTIONS
· Refilled the Exelon patch at the same dose, refilled memantine  · Encouraged to remain both physically and mentally active, including crossword puzzles, brain teasers  · Encouraged supplemental Boost or ensure shakes, given her poor diet and weight loss  Consider Nutritionist consult if further declines     · RTC in 6 months

## 2020-08-19 NOTE — PROGRESS NOTES
Review of Systems   Constitutional: Negative  Negative for appetite change and fever  HENT: Negative  Negative for hearing loss, tinnitus, trouble swallowing and voice change  Eyes: Negative  Negative for photophobia and pain  Respiratory: Negative  Negative for shortness of breath  Cardiovascular: Negative  Negative for palpitations  Gastrointestinal: Negative  Negative for nausea and vomiting  Endocrine: Negative  Negative for cold intolerance  Genitourinary: Negative  Negative for dysuria, frequency and urgency  Musculoskeletal: Negative  Negative for myalgias and neck pain  Skin: Negative  Negative for rash  Allergic/Immunologic: Negative  Neurological: Negative for dizziness, tremors, seizures, syncope, facial asymmetry, speech difficulty, weakness, light-headedness, numbness and headaches  Positive for forgetfulness     Hematological: Negative  Does not bruise/bleed easily  Psychiatric/Behavioral: Positive for confusion  Negative for hallucinations and sleep disturbance

## 2020-08-19 NOTE — LETTER
August 20, 2020     Destini Angelo, [address]  [address]    Patient: Lety Low   YOB: 1940   Date of Visit: 8/19/2020       Dear Dr Alex Daley:    Earlier today I saw Mrs Tamar Fleming for evaluation of her Alzheimer's disease  Below are my notes for this visit for your records and to keep you updated on her health status  If you have questions, please do not hesitate to call me  Sincerely,        Eldon Moseley MD        CC: No Recipients  Eldon Moseley MD  8/20/2020  5:51 AM  Sign when Signing Visit  [id number] [address] and [facility]        RETURN PATIENT NOTE    Patient: Lety Low  Medical Record Number: # [id number]  YOB: 1940  Date of visit: 8/19/2020    ASSESSMENT      Problem List Items Addressed This Visit        Nervous and Auditory    Alzheimer's dementia without behavioral disturbance ([facility] [address] ) - Primary    Relevant Medications    memantine (NAMENDA) 10 mg tablet    rivastigmine (EXELON) 4 6 mg/24 hr TD 24 hr patch        Impression of this 77 yo lady returning for likely Alzheimer's dementia, still overall slightly worse per  while on Exelon patch / memantine  Her appetite is poorer and requires more motivation to eat, which is a caregiving challenge  However, there are no new home safety concerns and he feels capable of still caring for her full-time  PLAN     · Refilled the Exelon patch at the same dose, refilled memantine  · Encouraged to remain both physically and mentally active, including crossword puzzles, brain teasers  · Encouraged supplemental Boost or ensure shakes, given her poor diet and weight loss  Consider Nutritionist consult if further declines  · RTC in 6 months    I spent 15 minutes directly with the patient during this visit    Reason for visit is Alzheimer's dementia      Provider located at [facility] Lyn [address] [facility] [address] [place] [address]    Recent Visits  No visits were found meeting these conditions  Showing recent visits within past 7 days and meeting all other requirements     Today's Visits  Date Type Provider Dept   08/19/20 Office Visit Corliss Dandy, MD [facility] [place]   Showing today's visits and meeting all other requirements     Future Appointments  No visits were found meeting these conditions  Showing future appointments within next 150 days and meeting all other requirements      Subjective  Mitchell Mendoza is a 78 y o  R-handed female with history of bilateral carotid bruit, bilateral hearing loss, hypertension, bilateral cataract, mitral and aortic heart valve disease, hyperlipidemia, osteoporosis, vertigo, vitamin-D deficiency, who returns for Alzheimers' dementia  Last encounter 4/29/29 over telephone but last in-person visit 12/18/19  The patient was accompanied today  History was obtained from patient and spouse    Interim History  No interim calls made to the office  She says she is not bothered by anything, but admits she cannot recall any if she did  Her , here today, says that her memory is in fact very frustrating to her  She has worsened in the interim, repeating questions every 15 minutes (e g  what's for dinner?)  She still does various household tasks without incident or endangering herself  Her medications are managed by , including the Exelon patch  She requires reminders to do so  She still can do her ADLs well  She is often confused about the date and time  No confusion about location, no hallucinations  She is on Exelon and memantine both  She stays home while  still goes to work  She tries to do some outdoor housework and 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  She feels she sleeps well, but her appetite is poor        Past Medical History:   Diagnosis Date    Hyperlipidemia     Hypertension     Memory loss      Past Surgical History:   Procedure Laterality Date    ELBOW SURGERY      REPLACEMENT TOTAL KNEE       Current Outpatient Medications   Medication Sig Dispense Refill    alendronate (FOSAMAX) 70 mg tablet       Cholecalciferol 2000 units CAPS Take 2 capsules by mouth daily       cyanocobalamin ([employer] VITAMIN B-12) 1000 MCG tablet Take 1,000 mcg by mouth      Ginkgo Biloba 400 MG CAPS Bid or tid      lisinopril (ZESTRIL) 2 5 mg tablet Take 5 mg by mouth daily       memantine (NAMENDA) 10 mg tablet Take 1 tablet (10 mg total) by mouth daily 90 tablet 3    Multiple Vitamins-Iron (DAILY LEROY MULTIVITAMIN/IRON) TABS Take 1 tablet by mouth      pravastatin (PRAVACHOL) 40 mg tablet Take 80 mg by mouth daily       rivastigmine (EXELON) 4 6 mg/24 hr TD 24 hr patch Place 1 patch on the skin daily 90 patch 3    alendronate (FOSAMAX) 70 mg tablet Take 70 mg by mouth Once a week       No current facility-administered medications for this visit  Allergies   Allergen Reactions    Acetaminophen-Codeine      Chest pain    Ciprofloxacin Hives     Action Taken: unknown;      Review of Systems   Unable to perform ROS: Dementia     FOCUSED PHYSICAL EXAMINATION:      Vitals:    08/19/20 1410   BP: (!) 172/78   Pulse: 77   Temp: (!) 97 4 °F (36 3 °C)      General:  Decreased affect  Well-appearing, well nourished, pleasant patient in no acute distress  Mood appropriate  Head:  Normocephalic, atraumatic  Oropharynx and conjunctiva are clear  Speech  +hypophonia, no bradylalia  No scanning speech  Language: Comprehension intact  Neck:  Supple, strong 5/5 forward flexion and retroflexion     Extremities: Range of motion is normal       Cognitive and Mental Exam:   MOCA previously was 26/30 in Jan 2019  [address] [place] [address] was 23/30 with 0/5 recall in Aug 2019    [address] [place] [address] version 2 0    Points MAX   Visuospatial  ----------   Trails A 1 1   Cube Drawing 0 1   Clock Drawing 3 3   Naming Objects 2 3 Attention  ----------   Digit Span 1 2   Letter Reading 1 1   Serial 7s 2 3   Language  ----------   Repetition 2 2   Fluency 1 1   Abstraction 0 2   Delayed Recall 0 5   Orientation               2           6              15 30   + 1 for  education = 16     Cranial Nerves:  CN II:   Direct and consensual light reflexes were equally reactive to light symmetrically  No afferent pupillary defect              Visual fields are full to confrontation  CN III / IV / VI: Extraocular movements were full, with normal pursuit and saccades  CN V:    Facial sensation to light touch was intact  CN VII: Face is symmetric with normal strength    CN VIII: Hearing was not assessed  CN X:   Palate is up going bilaterally and symmetrically  CN XI:  Neck muscles are strong  CN XII: Tongue protrusion is at midline with normal movements  No dysarthria       Motor:     Dystonia: none  Dyskinesia: none  Myoclonus: none  Chorea: none  Tics: none      Hammertoes in the R foot, surgically operated on before but splayed out  -------------------------------------------------------------------------------------     Muscle Strength Right Left   Muscle Strength Right Left   5/5 strength in both proximal and distal muscle groups of the UE / LE     Gait:  Normal comprehensive gait evaluation, has normal raising, stance, gait, turns  Feet are externally rotated on forward gait  No unsteadiness or tremors       Reflexes:    Right Left   Biceps 2/4 2/4   Brachioradialis 2/4 2/4   Triceps 2/4 2/4   Knee 1/4 1/4   Ankle 1/4 1/4        Kayla Brown  8/19/2020  2:14 PM  Sign when Signing Visit  Review of Systems   Constitutional: Negative  Negative for appetite change and fever  HENT: Negative  Negative for hearing loss, tinnitus, trouble swallowing and voice change  Eyes: Negative  Negative for photophobia and pain  Respiratory: Negative  Negative for shortness of breath  Cardiovascular: Negative    Negative for palpitations  Gastrointestinal: Negative  Negative for nausea and vomiting  Endocrine: Negative  Negative for cold intolerance  Genitourinary: Negative  Negative for dysuria, frequency and urgency  Musculoskeletal: Negative  Negative for myalgias and neck pain  Skin: Negative  Negative for rash  Allergic/Immunologic: Negative  Neurological: Negative for dizziness, tremors, seizures, syncope, facial asymmetry, speech difficulty, weakness, light-headedness, numbness and headaches  Positive for forgetfulness     Hematological: Negative  Does not bruise/bleed easily  Psychiatric/Behavioral: Positive for confusion  Negative for hallucinations and sleep disturbance

## 2020-10-07 ENCOUNTER — TELEPHONE (OUTPATIENT)
Dept: NEUROLOGY | Facility: CLINIC | Age: 80
End: 2020-10-07

## 2021-01-14 ENCOUNTER — OFFICE VISIT (OUTPATIENT)
Dept: NEUROLOGY | Facility: CLINIC | Age: 81
End: 2021-01-14
Payer: MEDICARE

## 2021-01-14 VITALS
HEART RATE: 72 BPM | DIASTOLIC BLOOD PRESSURE: 68 MMHG | WEIGHT: 132.2 LBS | BODY MASS INDEX: 20.71 KG/M2 | SYSTOLIC BLOOD PRESSURE: 130 MMHG

## 2021-01-14 DIAGNOSIS — G30.1 LATE ONSET ALZHEIMER'S DISEASE WITHOUT BEHAVIORAL DISTURBANCE (HCC): Primary | ICD-10-CM

## 2021-01-14 DIAGNOSIS — F02.80 LATE ONSET ALZHEIMER'S DISEASE WITHOUT BEHAVIORAL DISTURBANCE (HCC): Primary | ICD-10-CM

## 2021-01-14 PROCEDURE — 99214 OFFICE O/P EST MOD 30 MIN: CPT | Performed by: PHYSICIAN ASSISTANT

## 2021-01-14 NOTE — PATIENT INSTRUCTIONS
Things that we know are helpful for thinking and memory   1  Exercise program: gradually increase your physical activity over time  Start small and be patient  Aerobic (cardio) activity is best but incorporate balance, strength and flexibility training as well    a  Try to get at least 30min 3 times per week   2  Diet: Mediterranean diet (colorful fruits and vegetables, olive oil, fish, whole grains, very little red meet is any), MIND diet, anti-inflammatory diet  a  Stay well hydrated: drink 6-8 glasses of water per day   b  What's good for your heart is good for your brain  c  Avoid high-salt foods   3  Sleep: aim for at least 7-8 hours per night   a  Avoid alcohol and medications like Benadryl (Tylenol PM) or other sedating drugs  4  Stress management/mindfulness practice:   a  Talk with our social workers about finding a cognitive behavioral therapists  b  Try a smart phone guanakito like [employer] or Wishabi for beginners   i  Try curable for pain    c  Take a course in Mindfulness Based Stress Reduction (MBSR)   i  Beht soria  Read or listen to an audiobook about it:  i  Mindfulness for beginners   ii  10% happier  iii  The happiness advantage   5  Social engagement:   a  Stay in touch with family and friends   b  Plan a few specific activities for your social health every week   c  Chester Reyes a local support group   d  Volunteer! www hn org/volunteernow or call [phone]     MIND diet score:  1 point for each component      · Green leafy vegetables: at least 6 per week  · Other vegetable: at least 1 per day   · Berries: at least 2 per week  · Red meat: fewer than 4 per week   · Fish: at least 1 per week   · Poultry: at least 2 per week  · Beans: at least 3 per week  · Nuts: at least 5 per week  · Fast or fried food: less than 1 per week  · Olive oil   · Butter less: less than 1 table-spoon per day   · Cheese: less than 1 serving per week   · Pastries/sweets: less than 5 servings per week  · Alcohol: no more than 1 serving/ day

## 2021-01-14 NOTE — ASSESSMENT & PLAN NOTE
Patient with some progression per the  however he was overall stable on formal memory testing (16/30)  She is still able to perform her ADLs with minimal assistance  She has had a few episodes of confusion which have been alarming to the  and we discussed that this is likely part of the disease progression  She remains on rivastigmine and memantine  She also recently was part of a trial for light therapy in [place] and purchased the device to continue with this therapy at home  For now will not make any changes to her medication  She will continue to follow up with Geriatrics  She also continues to have a loss of appetite and we discussed trying to add some supplements such as Boost or Ensure  Will continue to monitor her weight  Patient was encouraged to increase mind stimulating activities such as reading, crosswords, word searches, puzzles, Soduku, solitaire, coloring and other brain games  We also discussed the importance of staying physically active and eating a health diet such as the Mediterranean or MIND diet

## 2021-01-14 NOTE — PROGRESS NOTES
Patient ID: Rowdy Diaz is a [de-identified] y o  female  Assessment/Plan:    Alzheimer's dementia without behavioral disturbance  Patient with some progression per the  however he was overall stable on formal memory testing (16/30)  She is still able to perform her ADLs with minimal assistance  She has had a few episodes of confusion which have been alarming to the  and we discussed that this is likely part of the disease progression  She remains on rivastigmine and memantine  She also recently was part of a trial for light therapy in [place] and purchased the device to continue with this therapy at home  For now will not make any changes to her medication  She will continue to follow up with Geriatrics  She also continues to have a loss of appetite and we discussed trying to add some supplements such as Boost or Ensure  Will continue to monitor her weight  Patient was encouraged to increase mind stimulating activities such as reading, crosswords, word searches, puzzles, Soduku, solitaire, coloring and other brain games  We also discussed the importance of staying physically active and eating a health diet such as the Mediterranean or MIND diet  Subjective: Rowdy Diaz is an [de-identified]  R-handed female with history of bilateral carotid bruit, bilateral hearing loss, hypertension, bilateral cataract, mitral and aortic heart valve disease, hyperlipidemia, osteoporosis, vertigo, vitamin-D deficiency, who returns for Alzheimers' dementia  To review, she was previously followed by Dr Galen Shukla and Dr Roxanna Wei  She originally presented to [address] [place] [address] Neuro as 2nd opinion for evaluation of her cognitive function  She was previously seen by [facility] Neurology team Dr Abigail Boyce 12/07/17  She endorsed short-term memory loss, forgetting some household functions, difficulties with directions, difficulties driving, starting since early-mid 2017 with some progression  Patient has been working with audiologist for hearing loss, and recommended hearing aids which she refused  MRI brain Neuroquant showed hippocampal volume loss at 11%, which is low normal, but significant enough to consider Alzheimer's dementia in case of progression of cognitive dysfunction  Patient was started on Exelon  She had Neuropsych evaluation with Dr Lashell Burgess on 5/2018 confirmed profound amnestic memory profile meeting the definition of dementia but without clear neurodegenerative processes  FTD showed pt scoring an 90% likelihood on failing on road  Repeat MRI NeuroQuant showed progressive hyppocampal volume loss in 9 month interval   She was later started in memantine (unable to tolerate bid dosing due to dizziness)  At her last visit she was slightly worse with an overall decreased appetite  No changes were made and she remains on Memantine and Exelon  INTERVAL HISTORY:  She presents with her   Her memory is slightly worse  She continues to have issues with her short term memory  She recently was part of a clinical trial  She had "light therapy" on the brain  She completed this about 3 weeks ago  Her  thought this was helpful for her mood  They will be purchasing the equipment to continue with this therapy at home  She is able to perform all of her ADls on her own  She sleeps well at night  She enjoys doing crossword puzzles  She does not seem to be as sleepy during the day for the past week  Her appetite is poor  Her  struggles with her eating at times  She will often feel full and does not eat a large amount  No agitation or aggressive behaviors  No hallucinations  She had one episode recently when she did not recognize her   I personally reviewed and updated the ROS  Objective:    Blood pressure 130/68, pulse 72, weight 60 kg (132 lb 3 2 oz)      Physical Exam  HENT:      Right Ear: Hearing normal       Left Ear: Hearing normal    Eyes:      General: Lids are normal       Extraocular Movements: Extraocular movements intact  Pupils: Pupils are equal, round, and reactive to light  Pulmonary:      Effort: Pulmonary effort is normal    Neurological:      Mental Status: She is alert  Neurological Exam  Mental Status  Alert  Oriented only to person  Able to name objects  Unable to perform serial calculations  MoCA 16/30 - 1/14/21    MoCA 16/30 - 8/19/20   MOCA  23/30 in Aug 2019  [address] in Jan 2019  Cranial Nerves  CN III, IV, VI: Extraocular movements intact bilaterally  Normal lids and orbits bilaterally  Pupils equal round and reactive to light bilaterally  CN V:  Right: Facial sensation is normal   Left: Facial sensation is normal on the left  CN VIII:  Right: Hearing is normal   Left: Hearing is normal   CN XI: Shoulder shrug strength is normal     Sensory  Light touch is normal in upper and lower extremities  Coordination  Right: Finger-to-nose normal   Left: Finger-to-nose normal         ROS:    Review of Systems   Constitutional: Positive for appetite change and fatigue  Negative for fever  HENT: Negative  Negative for hearing loss, tinnitus, trouble swallowing and voice change  Eyes: Negative  Negative for photophobia and pain  Respiratory: Negative  Negative for shortness of breath  Cardiovascular: Negative  Negative for palpitations  Gastrointestinal: Negative  Negative for nausea and vomiting  Endocrine: Negative  Negative for cold intolerance  Genitourinary: Negative  Negative for dysuria, frequency and urgency  Musculoskeletal: Negative  Negative for myalgias and neck pain  Skin: Negative  Negative for rash  Allergic/Immunologic: Negative  Neurological: Positive for dizziness (Very rare but sometimes)  Negative for tremors, seizures, syncope, facial asymmetry, speech difficulty, weakness, light-headedness, numbness and headaches  Hematological: Negative  Does not bruise/bleed easily  Psychiatric/Behavioral: Positive for confusion (Some)  Negative for hallucinations and sleep disturbance          Memory issues

## 2023-10-24 NOTE — TELEPHONE ENCOUNTER
Dr Niki Vázquez has signed the Letter for Krystina Kruse for [place]  The Letter has been faxed to [phone] and the patient's spouse has been notified and voiced understanding  Copy will be scanned to chart 
Letter printed and sent to Larisa Hong for Dr Thomas's signature 
Patients  called that she received a summons for jury duty  Per  patient is unable to participate  Ok to generate letter stating such? See letter initiated in communications  Shannan Rajan - Can you please assist with signature and faxing to patient/  Please fax request to [phone]  Please call  once sent 
Sure we can generate it  I'll sign it when I get it   Thanks
within normal limits